# Patient Record
Sex: FEMALE | Race: BLACK OR AFRICAN AMERICAN | NOT HISPANIC OR LATINO | Employment: FULL TIME | URBAN - METROPOLITAN AREA
[De-identification: names, ages, dates, MRNs, and addresses within clinical notes are randomized per-mention and may not be internally consistent; named-entity substitution may affect disease eponyms.]

---
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
## 2020-04-03 ENCOUNTER — TELEPHONE (OUTPATIENT)
Dept: FAMILY MEDICINE | Facility: CLINIC | Age: 20
End: 2020-04-03

## 2020-04-03 NOTE — TELEPHONE ENCOUNTER
Spoke with patient requesting medication for yeast infection SX vaginal D/C and itching , advised patient to check with pharmacy over the counter medication like Monistat or AZO , please advise.

## 2020-04-03 NOTE — TELEPHONE ENCOUNTER
----- Message from Yennifer Salter sent at 4/3/2020  4:45 PM CDT -----  Contact: Self  Type: Patient Call Back    Who called:Jesica    What is the request in detail:Patient called to request medication for a yeast infection. Please call to advise    Can the clinic reply by [facility]?    Would the patient rather a call back or a response via [facility]? Call    Best call back number:[phone]

## 2020-04-04 RX ORDER — FLUCONAZOLE 150 MG/1
150 TABLET ORAL ONCE
Qty: 1 TABLET | Refills: 0 | Status: SHIPPED | OUTPATIENT
Start: 2020-04-04 | End: 2020-04-04

## 2020-05-01 ENCOUNTER — HOSPITAL ENCOUNTER (EMERGENCY)
Facility: HOSPITAL | Age: 20
Discharge: HOME | End: 2020-05-01
Attending: EMERGENCY MEDICINE
Payer: COMMERCIAL

## 2020-05-01 VITALS
OXYGEN SATURATION: 95 % | RESPIRATION RATE: 15 BRPM | TEMPERATURE: 99 F | BODY MASS INDEX: 19.58 KG/M2 | WEIGHT: 125 LBS | HEART RATE: 78 BPM | SYSTOLIC BLOOD PRESSURE: 111 MMHG | DIASTOLIC BLOOD PRESSURE: 70 MMHG

## 2020-05-01 DIAGNOSIS — J06.9 URI, ACUTE: Primary | ICD-10-CM

## 2020-05-01 DIAGNOSIS — R05.9 COUGH: ICD-10-CM

## 2020-05-01 LAB — SARS-COV-2 RDRP RESP QL NAA+PROBE: NEGATIVE

## 2020-05-01 PROCEDURE — 99283 EMERGENCY DEPT VISIT LOW MDM: CPT | Mod: 25

## 2020-05-01 PROCEDURE — U0002 COVID-19 LAB TEST NON-CDC: HCPCS

## 2020-05-01 NOTE — ED NOTES
Bed: [facility] 05  Expected date:   Expected time:   Means of arrival:   Comments:

## 2020-05-01 NOTE — ED PROVIDER NOTES
Encounter Date: 5/1/2020       History     Chief Complaint   Patient presents with    COVID-19 Concerns     Boyfriend tested positive 3 days ago. Reports cough and SOB. Denies any other symptoms.      19 year old female with complaint of cough and congestion X 2 days.  Also reports mild SOB.  Reports boyfriend tested positive for COVID-19.  No chest pain. NO vomiting or diarrhea.         Review of patient's allergies indicates:  No Known Allergies  Past Medical History:   Diagnosis Date    Allergy     Asthma      History reviewed. No pertinent surgical history.  Family History   Problem Relation Age of Onset    Asthma Mother     Hypertension Mother     ADD / ADHD Father     Asthma Maternal Grandmother     Cancer Maternal Grandmother         uterus     Heart disease Maternal Grandmother     Cancer Maternal Grandfather         prostates     Cancer Paternal Grandmother         uterus      Social History     Tobacco Use    Smoking status: Never Smoker    Smokeless tobacco: Never Used   Substance Use Topics    Alcohol use: No    Drug use: No     Review of Systems   Constitutional: Negative for fever.   HENT: Positive for congestion. Negative for sore throat.    Respiratory: Positive for cough. Negative for shortness of breath.    Cardiovascular: Negative for chest pain.   Gastrointestinal: Negative for nausea.   Genitourinary: Negative for dysuria.   Musculoskeletal: Negative for back pain.   Skin: Negative for rash.   Neurological: Negative for weakness.   Hematological: Does not bruise/bleed easily.       Physical Exam     Initial Vitals [05/01/20 1310]   BP Pulse Resp Temp SpO2   111/70 78 15 99 °F (37.2 °C) 95 %      MAP       --         Physical Exam    Nursing note and vitals reviewed.  Constitutional: She appears well-developed and well-nourished.   HENT:   Head: Normocephalic and atraumatic.   + nasal congestion    Eyes: Conjunctivae and EOM are normal. Pupils are equal, round, and reactive to  light.   Neck: Normal range of motion. Neck supple.   Cardiovascular: Normal rate, regular rhythm, normal heart sounds and intact distal pulses.   Pulmonary/Chest: Breath sounds normal.   Abdominal: Soft. There is no tenderness. There is no rebound and no guarding.   Musculoskeletal: Normal range of motion.   Neurological: She is alert and oriented to person, place, and time. She has normal strength and normal reflexes.   Skin: Skin is warm and dry.   Psychiatric: She has a normal mood and affect. Her behavior is normal. Thought content normal.         ED Course   Procedures  Labs Reviewed   SARS-COV-2 RNA AMPLIFICATION, QUAL    Narrative:     What symptom criteria does the patient meet?->Cough  What symptom criteria does the patient meet?->Shortness of  breath or difficulty breathing          Imaging Results          X-Ray Chest 1 View (Final result)  Result time 05/01/20 13:51:51    Final result by Roscoe Shirley MD (05/01/20 13:51:51)                 Impression:      No acute process seen.      Electronically signed by: Rosoce Shirley MD  Date:    05/01/2020  Time:    13:51             Narrative:    EXAMINATION:  XR CHEST 1 VIEW    CLINICAL HISTORY:  Cough    FINDINGS:  Single view of the chest.  08/11/2013    Cardiac silhouette is normal.  The lungs demonstrate no evidence of active disease.  No evidence of pleural effusion or pneumothorax.  Bones appear intact.                                     Labs Reviewed   SARS-COV-2 RNA AMPLIFICATION, QUAL    Narrative:     What symptom criteria does the patient meet?->Cough  What symptom criteria does the patient meet?->Shortness of  breath or difficulty breathing                 Imaging Results          X-Ray Chest 1 View (Final result)  Result time 05/01/20 13:51:51    Final result by Roscoe Shirley MD (05/01/20 13:51:51)                 Impression:      No acute process seen.      Electronically signed by: Roscoe Shirley MD  Date:    05/01/2020  Time:    13:51              Narrative:    EXAMINATION:  XR CHEST 1 VIEW    CLINICAL HISTORY:  Cough    FINDINGS:  Single view of the chest.  08/11/2013    Cardiac silhouette is normal.  The lungs demonstrate no evidence of active disease.  No evidence of pleural effusion or pneumothorax.  Bones appear intact.                                                    Clinical Impression:       ICD-10-CM ICD-9-CM   1. URI, acute J06.9 465.9   2. Cough R05 786.2             ED Disposition Condition    Discharge Stable        ED Prescriptions     None        Follow-up Information     Follow up With Specialties Details Why Contact Info    Shannon Robin MD Family Medicine Schedule an appointment as soon as possible for a visit  As needed [id number] [facility]  [place] [id number]  [phone]                                       Jhon Sepulveda NP  05/01/20 1408       Jhon Sepulveda NP  05/01/20 1401

## 2020-07-29 ENCOUNTER — OFFICE VISIT (OUTPATIENT)
Dept: FAMILY MEDICINE | Facility: CLINIC | Age: 20
End: 2020-07-29
Payer: COMMERCIAL

## 2020-07-29 VITALS
TEMPERATURE: 98 F | HEART RATE: 84 BPM | OXYGEN SATURATION: 97 % | HEIGHT: 67 IN | BODY MASS INDEX: 19.62 KG/M2 | SYSTOLIC BLOOD PRESSURE: 100 MMHG | WEIGHT: 125 LBS | DIASTOLIC BLOOD PRESSURE: 64 MMHG

## 2020-07-29 DIAGNOSIS — N76.0 BV (BACTERIAL VAGINOSIS): Primary | ICD-10-CM

## 2020-07-29 DIAGNOSIS — B96.89 BV (BACTERIAL VAGINOSIS): Primary | ICD-10-CM

## 2020-07-29 PROCEDURE — 99999 PR PBB SHADOW E&M-EST. PATIENT-LVL III: CPT | Mod: PBBFAC,,, | Performed by: FAMILY MEDICINE

## 2020-07-29 PROCEDURE — 3008F PR BODY MASS INDEX (BMI) DOCUMENTED: ICD-10-PCS | Mod: CPTII,S$GLB,, | Performed by: FAMILY MEDICINE

## 2020-07-29 PROCEDURE — 99214 OFFICE O/P EST MOD 30 MIN: CPT | Mod: S$GLB,,, | Performed by: FAMILY MEDICINE

## 2020-07-29 PROCEDURE — 99999 PR PBB SHADOW E&M-EST. PATIENT-LVL III: ICD-10-PCS | Mod: PBBFAC,,, | Performed by: FAMILY MEDICINE

## 2020-07-29 PROCEDURE — 99214 PR OFFICE/OUTPT VISIT, EST, LEVL IV, 30-39 MIN: ICD-10-PCS | Mod: S$GLB,,, | Performed by: FAMILY MEDICINE

## 2020-07-29 PROCEDURE — 3008F BODY MASS INDEX DOCD: CPT | Mod: CPTII,S$GLB,, | Performed by: FAMILY MEDICINE

## 2020-07-29 RX ORDER — METRONIDAZOLE 7.5 MG/G
1 GEL VAGINAL 2 TIMES DAILY
Qty: 70 G | Refills: 0 | Status: SHIPPED | OUTPATIENT
Start: 2020-07-29 | End: 2020-08-05

## 2020-07-29 NOTE — PROGRESS NOTES
Subjective:       Patient ID: Jesica Noe is a 19 y.o. female.    Chief Complaint: Annual Exam    19 year old female presents for an annual exam. She states since she has been on the depot shot she gets a vaginal discharge. She states it comes and goes. She states it has an odor. She states she changed her soap as it was making her body itch.       She states her school wants a note that she is no longer on her birth control. They feel she was on a birth control that caused her depression. She failed a class and they want a note stating that she is no longer depressed and on birth control. She states she was sad as she was home sick.     Past Medical History:  No date: Allergy  No date: Asthma   History reviewed. No pertinent surgical history.  Review of patient's family history indicates:  Problem: Asthma      Relation: Mother          Age of Onset: (Not Specified)  Problem: Hypertension      Relation: Mother          Age of Onset: (Not Specified)  Problem: ADD / ADHD      Relation: Father          Age of Onset: (Not Specified)  Problem: Asthma      Relation: Maternal Grandmother          Age of Onset: (Not Specified)  Problem: Cancer      Relation: Maternal Grandmother          Age of Onset: (Not Specified)          Comment: uterus   Problem: Heart disease      Relation: Maternal Grandmother          Age of Onset: (Not Specified)  Problem: Cancer      Relation: Maternal Grandfather          Age of Onset: (Not Specified)          Comment: prostates   Problem: Cancer      Relation: Paternal Grandmother          Age of Onset: (Not Specified)          Comment: uterus     Social History    Socioeconomic History      Marital status: Single      Spouse name: Not on file      Number of children: Not on file      Years of education: Not on file      Highest education level: Not on file    Occupational History      Not on file    Social Needs      Financial resource strain: Not on file      Food insecurity        Worry:  "Not on file        Inability: Not on file      Transportation needs        Medical: Not on file        Non-medical: Not on file    Tobacco Use      Smoking status: Never Smoker      Smokeless tobacco: Never Used    Substance and Sexual Activity      Alcohol use: No      Drug use: No      Sexual activity: Yes        Partners: Male        Birth control/protection: Injection    Lifestyle      Physical activity        Days per week: Not on file        Minutes per session: Not on file      Stress: Not on file    Relationships      Social connections        Talks on phone: Not on file        Gets together: Not on file        Attends [employer] service: Not on file        Active member of club or organization: Not on file        Attends meetings of clubs or organizations: Not on file        Relationship status: Not on file    Other Topics      Concerns:        Not on file    Social History Narrative      Not on file        Review of Systems      Objective:       Vitals:    07/29/20 1318   BP: 100/64   Pulse: 84   Temp: 98 °F (36.7 °C)   TempSrc: Oral   SpO2: 97%   Weight: 56.7 kg (125 lb)   Height: 5' 6.5" (1.689 m)       Physical Exam  Constitutional:       Appearance: Normal appearance.   Neurological:      Mental Status: She is alert.   Psychiatric:         Mood and Affect: Mood normal.         Behavior: Behavior normal.         Thought Content: Thought content normal.         Judgment: Judgment normal.         Assessment:       1. BV (bacterial vaginosis)        Plan:       Jesica was seen today for annual exam.    Diagnoses and all orders for this visit:    BV (bacterial vaginosis)  -     metroNIDAZOLE (METROGEL) 0.75 % vaginal gel; Place 1 applicator vaginally 2 (two) times daily. for 7 days           "

## 2020-08-13 ENCOUNTER — TELEPHONE (OUTPATIENT)
Dept: FAMILY MEDICINE | Facility: CLINIC | Age: 20
End: 2020-08-13

## 2020-08-13 NOTE — TELEPHONE ENCOUNTER
Return call to Pt, and she states that she lost the letter that was given to her by the Provider. She was needing it to be emailed to her. Informed her that we don't email. That she can either pick it up at the office or it can be mailed to her. She asked that it be faxed. I asked for the fax number, and she stated that she will call back with the number to her school.

## 2020-08-13 NOTE — TELEPHONE ENCOUNTER
----- Message from SalvadorHeide Christy sent at 8/13/2020  4:01 PM CDT -----  Regarding: Letter  Contact: Patient  Type: Patient Call Back    Who called:Patient    What is the request in detail: She states she needs letter emailed to her. Please advise.    Can the clinic reply by [facility]? No    Would the patient rather a call back or a response via [facility]? Call    Best call back number: [phone]    Additional Information: n/a

## 2020-08-14 ENCOUNTER — TELEPHONE (OUTPATIENT)
Dept: FAMILY MEDICINE | Facility: CLINIC | Age: 20
End: 2020-08-14

## 2020-08-14 DIAGNOSIS — Z11.59 NEED FOR HEPATITIS C SCREENING TEST: ICD-10-CM

## 2020-08-14 NOTE — TELEPHONE ENCOUNTER
----- Message from Inocencia Christy sent at 8/14/2020  1:23 PM CDT -----  Type: Patient Call Back    Who called: Self     What is the request in detail: patient says the fax number to send the information to her school is Fax: [phone]. Patient would like to be notified when it is taken care of. Please call     Can the clinic reply by [facility]? No     Would the patient rather a call back or a response via [facility]?  Call     Best call back number: [phone]

## 2020-08-17 ENCOUNTER — TELEPHONE (OUTPATIENT)
Dept: FAMILY MEDICINE | Facility: CLINIC | Age: 20
End: 2020-08-17

## 2020-09-03 ENCOUNTER — HOSPITAL ENCOUNTER (EMERGENCY)
Facility: HOSPITAL | Age: 20
Discharge: HOME | End: 2020-09-03
Attending: EMERGENCY MEDICINE
Payer: COMMERCIAL

## 2020-09-03 VITALS
HEART RATE: 87 BPM | TEMPERATURE: 98 F | WEIGHT: 123.25 LBS | HEIGHT: 67 IN | SYSTOLIC BLOOD PRESSURE: 134 MMHG | OXYGEN SATURATION: 96 % | RESPIRATION RATE: 16 BRPM | BODY MASS INDEX: 19.35 KG/M2 | DIASTOLIC BLOOD PRESSURE: 80 MMHG

## 2020-09-03 DIAGNOSIS — L02.91 ABSCESS: Primary | ICD-10-CM

## 2020-09-03 PROCEDURE — 99283 EMERGENCY DEPT VISIT LOW MDM: CPT

## 2020-09-03 RX ORDER — CLINDAMYCIN HYDROCHLORIDE 300 MG/1
300 CAPSULE ORAL 3 TIMES DAILY
Qty: 21 CAPSULE | Refills: 0 | Status: SHIPPED | OUTPATIENT
Start: 2020-09-03 | End: 2020-09-10

## 2020-09-04 NOTE — ED PROVIDER NOTES
HISTORY     Chief Complaint   Patient presents with    Insect Bite     L knee; pain, swelling, redness     Review of patient's allergies indicates:  No Known Allergies     HPI   The history is provided by the patient. No  was used.   Abscess   This is a new problem. The current episode started two days ago. The problem occurs rarely. The problem has been unchanged. Affected Location: left anterior knee. The abscess is characterized by redness and painfulness. Pertinent negatives include no anorexia, no decrease in physical activity, not sleeping less, not drinking less, no fever, no diarrhea, no vomiting, no congestion, no rhinorrhea, no sore throat, no decreased responsiveness and no cough.    pt reports she went to urgent care yesterday and was placed on bactrim. Pt requesting different antibiotic     PCP: Shannon Robin MD     Past Medical History:  Past Medical History:   Diagnosis Date    Allergy     Asthma         Past Surgical History:  History reviewed. No pertinent surgical history.     Family History:  Family History   Problem Relation Age of Onset    Asthma Mother     Hypertension Mother     ADD / ADHD Father     Asthma Maternal Grandmother     Cancer Maternal Grandmother         uterus     Heart disease Maternal Grandmother     Cancer Maternal Grandfather         prostates     Cancer Paternal Grandmother         uterus         Social History:  Social History     Tobacco Use    Smoking status: Never Smoker    Smokeless tobacco: Never Used   Substance and Sexual Activity    Alcohol use: No    Drug use: No    Sexual activity: Yes     Partners: Male     Birth control/protection: Injection         ROS   Review of Systems   Constitutional: Negative for decreased responsiveness and fever.   HENT: Negative for congestion, rhinorrhea and sore throat.    Respiratory: Negative for cough and shortness of breath.    Cardiovascular: Negative for chest pain.  "  Gastrointestinal: Negative for anorexia, diarrhea, nausea and vomiting.   Genitourinary: Negative for dysuria.   Musculoskeletal: Negative for back pain.   Skin: Negative for rash.        +  Abscess     Neurological: Negative for weakness.   Hematological: Does not bruise/bleed easily.       PHYSICAL EXAM     Initial Vitals [09/03/20 2246]   BP Pulse Resp Temp SpO2   134/80 87 16 98 °F (36.7 °C) 96 %      MAP       --           Physical Exam    Nursing note and vitals reviewed.  Constitutional: She appears well-developed and well-nourished. She is not diaphoretic. No distress.   HENT:   Head: Normocephalic and atraumatic.   Eyes: Conjunctivae are normal. Right eye exhibits no discharge. Left eye exhibits no discharge.   Neck: Normal range of motion. Neck supple.   Cardiovascular: Normal rate.   Pulmonary/Chest: Breath sounds normal. No respiratory distress. She has no wheezes. She has no rhonchi. She has no rales.   Musculoskeletal: Normal range of motion.   Neurological: She is alert and oriented to person, place, and time. She has normal strength.   Skin: Skin is warm and dry.   2cm abscess above left anterior knee. Mild induration and fluctuance    Psychiatric: She has a normal mood and affect. Her behavior is normal. Thought content normal.          ED COURSE   Procedures  ED ONGOING VITALS:  Vitals:    09/03/20 2246   BP: 134/80   Pulse: 87   Resp: 16   Temp: 98 °F (36.7 °C)   TempSrc: Oral   SpO2: 96%   Weight: 55.9 kg (123 lb 3.8 oz)   Height: 5' 7" (1.702 m)         ABNORMAL LAB VALUES:  Labs Reviewed - No data to display      ALL LAB VALUES:  none      RADIOLOGY STUDIES:  Imaging Results    None                   The above vital signs and test results have been reviewed by the emergency provider.     ED Medications:  Current Discharge Medication List        Discharge Medications:  New Prescriptions    CLINDAMYCIN (CLEOCIN) 300 MG CAPSULE    Take 1 capsule (300 mg total) by mouth 3 (three) times daily. for " 7 days      Follow-up Information     Shannon Robin MD In 3 days.    Specialty: Family Medicine  Why: For wound re-check  Contact information:  [address] [place] [address]  [phone]                10:55 PM  Pt refusing I and d in the ER. Pt reports she only wants antibiotic treatment. Risks/benefits discussed.    I discussed with patient and/or family/caretaker that evaluation in the ED does not suggest any emergent or life threatening medical conditions requiring immediate intervention beyond what was provided in the ED, and I believe patient is safe for discharge. Regardless, an unremarkable evaluation in the ED does not preclude the development or presence of a serious or life threatening condition. As such, patient was instructed to return immediately for any worsening or change in current symptoms.    Pre-hypertension/Hypertension: The pt has been informed that they may have pre-hypertension or hypertension based on a blood pressure reading in the ED. I recommend that the pt call the PCP listed on their discharge instructions or a physician of their choice this week to arrange f/u for further evaluation of possible pre-hypertension or hypertension.       MEDICAL DECISION MAKING                 CLINICAL IMPRESSION       ICD-10-CM ICD-9-CM   1. Abscess  L02.91 682.9       Disposition:   Disposition: Discharged  Condition: Stable         Drew Perea NP  09/03/20 9041

## 2020-12-21 ENCOUNTER — OFFICE VISIT (OUTPATIENT)
Dept: INTERNAL MEDICINE | Facility: CLINIC | Age: 20
End: 2020-12-21
Payer: COMMERCIAL

## 2020-12-21 VITALS
DIASTOLIC BLOOD PRESSURE: 80 MMHG | OXYGEN SATURATION: 98 % | HEIGHT: 67 IN | BODY MASS INDEX: 20.48 KG/M2 | TEMPERATURE: 98 F | SYSTOLIC BLOOD PRESSURE: 122 MMHG | HEART RATE: 72 BPM | WEIGHT: 130.5 LBS

## 2020-12-21 DIAGNOSIS — Z00.00 PHYSICAL EXAM: Primary | ICD-10-CM

## 2020-12-21 PROCEDURE — 3008F PR BODY MASS INDEX (BMI) DOCUMENTED: ICD-10-PCS | Mod: CPTII,S$GLB,, | Performed by: PHYSICIAN ASSISTANT

## 2020-12-21 PROCEDURE — 3008F BODY MASS INDEX DOCD: CPT | Mod: CPTII,S$GLB,, | Performed by: PHYSICIAN ASSISTANT

## 2020-12-21 PROCEDURE — 99395 PREV VISIT EST AGE 18-39: CPT | Mod: S$GLB,,, | Performed by: PHYSICIAN ASSISTANT

## 2020-12-21 PROCEDURE — 99999 PR PBB SHADOW E&M-EST. PATIENT-LVL III: ICD-10-PCS | Mod: PBBFAC,,, | Performed by: PHYSICIAN ASSISTANT

## 2020-12-21 PROCEDURE — 1126F PR PAIN SEVERITY QUANTIFIED, NO PAIN PRESENT: ICD-10-PCS | Mod: S$GLB,,, | Performed by: PHYSICIAN ASSISTANT

## 2020-12-21 PROCEDURE — 99999 PR PBB SHADOW E&M-EST. PATIENT-LVL III: CPT | Mod: PBBFAC,,, | Performed by: PHYSICIAN ASSISTANT

## 2020-12-21 PROCEDURE — 1126F AMNT PAIN NOTED NONE PRSNT: CPT | Mod: S$GLB,,, | Performed by: PHYSICIAN ASSISTANT

## 2020-12-21 PROCEDURE — 99395 PR PREVENTIVE VISIT,EST,18-39: ICD-10-PCS | Mod: S$GLB,,, | Performed by: PHYSICIAN ASSISTANT

## 2020-12-21 NOTE — PROGRESS NOTES
Subjective:      Patient ID: Jesica Noe is a 20 y.o. female.    Chief Complaint: Annual Exam    HPI   Here today for a doctors certificate formed to become a . Needs clearance to do a fitness test (sit-ups, push-ups, 1.5 mile run).     Patient Active Problem List   Diagnosis    Asthma-stable. Mild. Rarely needs inhaler.     Chronic midline thoracic back pain -conservative management.    No chest pain, shortness of breath, or palpitations. No weakness/dizziness/lightheadedness.   Exertion is good.     Current Outpatient Medications:     albuterol (PROAIR HFA) 90 mcg/actuation inhaler, Inhale 2 puffs into the lungs every 4 (four) hours as needed., Disp: 1 Inhaler, Rfl: 12    fluticasone propionate (FLONASE) 50 mcg/actuation nasal spray, 1 spray (50 mcg total) by Each Nare route once daily., Disp: 16 g, Rfl: 5    ibuprofen (ADVIL,MOTRIN) 400 MG tablet, TAKE 1 TABLET(400 MG) BY MOUTH EVERY 6 HOURS AS NEEDED, Disp: 385 tablet, Rfl: 2    levocetirizine (XYZAL) 5 MG tablet, Take 1 tablet (5 mg total) by mouth every evening., Disp: 30 tablet, Rfl: 11    Review of Systems   Constitutional: Negative for activity change, appetite change, chills, diaphoresis, fatigue, fever and unexpected weight change.   HENT: Negative.  Negative for congestion, hearing loss, postnasal drip, rhinorrhea, sore throat, trouble swallowing and voice change.    Eyes: Negative.  Negative for visual disturbance.   Respiratory: Negative.  Negative for cough, choking, chest tightness and shortness of breath.    Cardiovascular: Negative for chest pain, palpitations and leg swelling.   Gastrointestinal: Negative for abdominal distention, abdominal pain, blood in stool, constipation, diarrhea, nausea and vomiting.   Endocrine: Negative for cold intolerance, heat intolerance, polydipsia and polyuria.   Genitourinary: Negative.  Negative for difficulty urinating and frequency.   Musculoskeletal: Negative for arthralgias, back  "pain, gait problem, joint swelling and myalgias.   Skin: Negative for color change, pallor, rash and wound.   Neurological: Negative for dizziness, tremors, weakness, light-headedness, numbness and headaches.   Hematological: Negative for adenopathy.   Psychiatric/Behavioral: Negative for behavioral problems, confusion, self-injury, sleep disturbance and suicidal ideas. The patient is not nervous/anxious.      Objective:   /80 (BP Location: Left arm, Patient Position: Sitting, BP Method: Medium (Manual))   Pulse 72   Temp 98.2 °F (36.8 °C) (Tympanic)   Ht 5' 7" (1.702 m)   Wt 59.2 kg (130 lb 8.2 oz)   SpO2 98%   BMI 20.44 kg/m²     Physical Exam  Vitals signs reviewed.   Constitutional:       Appearance: She is well-developed.   HENT:      Head: Normocephalic and atraumatic.      Right Ear: External ear normal.      Left Ear: External ear normal.      Nose: Nose normal.   Eyes:      Conjunctiva/sclera: Conjunctivae normal.      Pupils: Pupils are equal, round, and reactive to light.   Neck:      Musculoskeletal: Normal range of motion and neck supple.   Cardiovascular:      Rate and Rhythm: Normal rate and regular rhythm.      Heart sounds: Normal heart sounds. No murmur. No friction rub. No gallop.    Pulmonary:      Effort: Pulmonary effort is normal. No respiratory distress.      Breath sounds: Normal breath sounds. No wheezing or rales.   Chest:      Chest wall: No tenderness.   Abdominal:      General: There is no distension.      Palpations: Abdomen is soft.      Tenderness: There is no abdominal tenderness.   Musculoskeletal: Normal range of motion.   Lymphadenopathy:      Cervical: No cervical adenopathy.   Skin:     General: Skin is warm and dry.   Neurological:      Mental Status: She is alert and oriented to person, place, and time.   Psychiatric:         Behavior: Behavior normal.         Thought Content: Thought content normal.         Judgment: Judgment normal.         Assessment:     1. " Physical exam      Plan:   Physical exam    -stretch before exercise  Advise to maintain lifestyle changes with low carbohydrate, low sugar diet and exercise 30 minutes daily    Follow up in about 1 year (around 12/21/2021), or if symptoms worsen or fail to improve.

## 2021-04-12 ENCOUNTER — OFFICE VISIT (OUTPATIENT)
Dept: INTERNAL MEDICINE | Facility: CLINIC | Age: 21
End: 2021-04-12
Payer: COMMERCIAL

## 2021-04-12 VITALS
OXYGEN SATURATION: 98 % | HEIGHT: 66 IN | DIASTOLIC BLOOD PRESSURE: 62 MMHG | BODY MASS INDEX: 21.49 KG/M2 | RESPIRATION RATE: 18 BRPM | WEIGHT: 133.69 LBS | SYSTOLIC BLOOD PRESSURE: 104 MMHG | HEART RATE: 68 BPM | TEMPERATURE: 99 F

## 2021-04-12 DIAGNOSIS — R82.90 MALODOROUS URINE: Primary | ICD-10-CM

## 2021-04-12 LAB
BILIRUB SERPL-MCNC: NORMAL MG/DL
BILIRUB UR QL STRIP: NEGATIVE
BLOOD URINE, POC: NORMAL
CLARITY UR REFRACT.AUTO: CLEAR
COLOR UR AUTO: YELLOW
COLOR, POC UA: NORMAL
GLUCOSE UR QL STRIP: NEGATIVE
GLUCOSE UR QL STRIP: NORMAL
HGB UR QL STRIP: NEGATIVE
KETONES UR QL STRIP: NEGATIVE
KETONES UR QL STRIP: NORMAL
LEUKOCYTE ESTERASE UR QL STRIP: NEGATIVE
LEUKOCYTE ESTERASE URINE, POC: NORMAL
NITRITE UR QL STRIP: NEGATIVE
NITRITE, POC UA: NORMAL
PH UR STRIP: 6 [PH] (ref 5–8)
PH, POC UA: 6
PROT UR QL STRIP: NEGATIVE
PROTEIN, POC: NORMAL
SP GR UR STRIP: 1.02 (ref 1–1.03)
SPECIFIC GRAVITY, POC UA: 1.01
URN SPEC COLLECT METH UR: NORMAL
UROBILINOGEN, POC UA: NORMAL

## 2021-04-12 PROCEDURE — 81003 URINALYSIS AUTO W/O SCOPE: CPT | Performed by: FAMILY MEDICINE

## 2021-04-12 PROCEDURE — 81001 POCT URINALYSIS, DIPSTICK OR TABLET REAGENT, AUTOMATED, WITH MICROSCOP: ICD-10-PCS | Mod: S$GLB,,, | Performed by: FAMILY MEDICINE

## 2021-04-12 PROCEDURE — 81001 URINALYSIS AUTO W/SCOPE: CPT | Mod: S$GLB,,, | Performed by: FAMILY MEDICINE

## 2021-04-12 PROCEDURE — 1126F AMNT PAIN NOTED NONE PRSNT: CPT | Mod: S$GLB,,, | Performed by: FAMILY MEDICINE

## 2021-04-12 PROCEDURE — 99212 OFFICE O/P EST SF 10 MIN: CPT | Mod: 25,S$GLB,, | Performed by: FAMILY MEDICINE

## 2021-04-12 PROCEDURE — 3008F BODY MASS INDEX DOCD: CPT | Mod: CPTII,S$GLB,, | Performed by: FAMILY MEDICINE

## 2021-04-12 PROCEDURE — 3008F PR BODY MASS INDEX (BMI) DOCUMENTED: ICD-10-PCS | Mod: CPTII,S$GLB,, | Performed by: FAMILY MEDICINE

## 2021-04-12 PROCEDURE — 99999 PR PBB SHADOW E&M-EST. PATIENT-LVL III: ICD-10-PCS | Mod: PBBFAC,,, | Performed by: FAMILY MEDICINE

## 2021-04-12 PROCEDURE — 1126F PR PAIN SEVERITY QUANTIFIED, NO PAIN PRESENT: ICD-10-PCS | Mod: S$GLB,,, | Performed by: FAMILY MEDICINE

## 2021-04-12 PROCEDURE — 99999 PR PBB SHADOW E&M-EST. PATIENT-LVL III: CPT | Mod: PBBFAC,,, | Performed by: FAMILY MEDICINE

## 2021-04-12 PROCEDURE — 99212 PR OFFICE/OUTPT VISIT, EST, LEVL II, 10-19 MIN: ICD-10-PCS | Mod: 25,S$GLB,, | Performed by: FAMILY MEDICINE

## 2021-04-13 ENCOUNTER — TELEPHONE (OUTPATIENT)
Dept: INTERNAL MEDICINE | Facility: CLINIC | Age: 21
End: 2021-04-13

## 2021-04-16 ENCOUNTER — PATIENT MESSAGE (OUTPATIENT)
Dept: RESEARCH | Facility: HOSPITAL | Age: 21
End: 2021-04-16

## 2021-08-05 ENCOUNTER — OFFICE VISIT (OUTPATIENT)
Dept: INTERNAL MEDICINE | Facility: CLINIC | Age: 21
End: 2021-08-05
Payer: COMMERCIAL

## 2021-08-05 VITALS
SYSTOLIC BLOOD PRESSURE: 114 MMHG | BODY MASS INDEX: 22 KG/M2 | OXYGEN SATURATION: 98 % | RESPIRATION RATE: 14 BRPM | WEIGHT: 136.88 LBS | HEART RATE: 51 BPM | DIASTOLIC BLOOD PRESSURE: 68 MMHG | HEIGHT: 66 IN

## 2021-08-05 DIAGNOSIS — N89.8 VAGINAL DISCHARGE: Primary | ICD-10-CM

## 2021-08-05 PROCEDURE — 3078F DIAST BP <80 MM HG: CPT | Mod: CPTII,S$GLB,, | Performed by: PHYSICIAN ASSISTANT

## 2021-08-05 PROCEDURE — 1126F AMNT PAIN NOTED NONE PRSNT: CPT | Mod: CPTII,S$GLB,, | Performed by: PHYSICIAN ASSISTANT

## 2021-08-05 PROCEDURE — 1160F PR REVIEW ALL MEDS BY PRESCRIBER/CLIN PHARMACIST DOCUMENTED: ICD-10-PCS | Mod: CPTII,S$GLB,, | Performed by: PHYSICIAN ASSISTANT

## 2021-08-05 PROCEDURE — 1159F MED LIST DOCD IN RCRD: CPT | Mod: CPTII,S$GLB,, | Performed by: PHYSICIAN ASSISTANT

## 2021-08-05 PROCEDURE — 1159F PR MEDICATION LIST DOCUMENTED IN MEDICAL RECORD: ICD-10-PCS | Mod: CPTII,S$GLB,, | Performed by: PHYSICIAN ASSISTANT

## 2021-08-05 PROCEDURE — 3074F PR MOST RECENT SYSTOLIC BLOOD PRESSURE < 130 MM HG: ICD-10-PCS | Mod: CPTII,S$GLB,, | Performed by: PHYSICIAN ASSISTANT

## 2021-08-05 PROCEDURE — 3008F BODY MASS INDEX DOCD: CPT | Mod: CPTII,S$GLB,, | Performed by: PHYSICIAN ASSISTANT

## 2021-08-05 PROCEDURE — 87491 CHLMYD TRACH DNA AMP PROBE: CPT | Performed by: PHYSICIAN ASSISTANT

## 2021-08-05 PROCEDURE — 3078F PR MOST RECENT DIASTOLIC BLOOD PRESSURE < 80 MM HG: ICD-10-PCS | Mod: CPTII,S$GLB,, | Performed by: PHYSICIAN ASSISTANT

## 2021-08-05 PROCEDURE — 87481 CANDIDA DNA AMP PROBE: CPT | Mod: 59 | Performed by: PHYSICIAN ASSISTANT

## 2021-08-05 PROCEDURE — 99213 OFFICE O/P EST LOW 20 MIN: CPT | Mod: S$GLB,,, | Performed by: PHYSICIAN ASSISTANT

## 2021-08-05 PROCEDURE — 99999 PR PBB SHADOW E&M-EST. PATIENT-LVL IV: CPT | Mod: PBBFAC,,, | Performed by: PHYSICIAN ASSISTANT

## 2021-08-05 PROCEDURE — 3008F PR BODY MASS INDEX (BMI) DOCUMENTED: ICD-10-PCS | Mod: CPTII,S$GLB,, | Performed by: PHYSICIAN ASSISTANT

## 2021-08-05 PROCEDURE — 3074F SYST BP LT 130 MM HG: CPT | Mod: CPTII,S$GLB,, | Performed by: PHYSICIAN ASSISTANT

## 2021-08-05 PROCEDURE — 1126F PR PAIN SEVERITY QUANTIFIED, NO PAIN PRESENT: ICD-10-PCS | Mod: CPTII,S$GLB,, | Performed by: PHYSICIAN ASSISTANT

## 2021-08-05 PROCEDURE — 99213 PR OFFICE/OUTPT VISIT, EST, LEVL III, 20-29 MIN: ICD-10-PCS | Mod: S$GLB,,, | Performed by: PHYSICIAN ASSISTANT

## 2021-08-05 PROCEDURE — 87591 N.GONORRHOEAE DNA AMP PROB: CPT | Mod: 59 | Performed by: PHYSICIAN ASSISTANT

## 2021-08-05 PROCEDURE — 1160F RVW MEDS BY RX/DR IN RCRD: CPT | Mod: CPTII,S$GLB,, | Performed by: PHYSICIAN ASSISTANT

## 2021-08-05 PROCEDURE — 99999 PR PBB SHADOW E&M-EST. PATIENT-LVL IV: ICD-10-PCS | Mod: PBBFAC,,, | Performed by: PHYSICIAN ASSISTANT

## 2021-08-07 LAB
C TRACH DNA SPEC QL NAA+PROBE: NOT DETECTED
N GONORRHOEA DNA SPEC QL NAA+PROBE: NOT DETECTED

## 2021-08-09 LAB
BACTERIAL VAGINOSIS DNA: POSITIVE
CANDIDA GLABRATA DNA: NEGATIVE
CANDIDA KRUSEI DNA: NEGATIVE
CANDIDA RRNA VAG QL PROBE: NEGATIVE
T VAGINALIS RRNA GENITAL QL PROBE: NEGATIVE

## 2021-08-10 ENCOUNTER — TELEPHONE (OUTPATIENT)
Dept: INTERNAL MEDICINE | Facility: CLINIC | Age: 21
End: 2021-08-10

## 2021-08-10 DIAGNOSIS — N76.0 BACTERIAL VAGINOSIS: Primary | ICD-10-CM

## 2021-08-10 DIAGNOSIS — B96.89 BACTERIAL VAGINOSIS: ICD-10-CM

## 2021-08-10 DIAGNOSIS — N76.0 BACTERIAL VAGINOSIS: ICD-10-CM

## 2021-08-10 DIAGNOSIS — B96.89 BACTERIAL VAGINOSIS: Primary | ICD-10-CM

## 2021-08-10 RX ORDER — METRONIDAZOLE 7.5 MG/G
1 GEL VAGINAL 2 TIMES DAILY
Qty: 70 G | Refills: 0 | Status: SHIPPED | OUTPATIENT
Start: 2021-08-10 | End: 2021-08-10 | Stop reason: SDUPTHER

## 2021-08-10 RX ORDER — METRONIDAZOLE 7.5 MG/G
1 GEL VAGINAL 2 TIMES DAILY
Qty: 70 G | Refills: 0 | Status: SHIPPED | OUTPATIENT
Start: 2021-08-10 | End: 2021-08-17

## 2021-09-16 ENCOUNTER — PATIENT MESSAGE (OUTPATIENT)
Dept: INTERNAL MEDICINE | Facility: CLINIC | Age: 21
End: 2021-09-16

## 2021-09-16 ENCOUNTER — OFFICE VISIT (OUTPATIENT)
Dept: INTERNAL MEDICINE | Facility: CLINIC | Age: 21
End: 2021-09-16
Payer: COMMERCIAL

## 2021-09-16 VITALS
DIASTOLIC BLOOD PRESSURE: 72 MMHG | BODY MASS INDEX: 21.55 KG/M2 | OXYGEN SATURATION: 100 % | TEMPERATURE: 100 F | HEIGHT: 66 IN | WEIGHT: 134.06 LBS | SYSTOLIC BLOOD PRESSURE: 118 MMHG | HEART RATE: 86 BPM

## 2021-09-16 DIAGNOSIS — M54.6 CHRONIC BILATERAL THORACIC BACK PAIN: Primary | ICD-10-CM

## 2021-09-16 DIAGNOSIS — G89.29 CHRONIC BILATERAL THORACIC BACK PAIN: Primary | ICD-10-CM

## 2021-09-16 PROCEDURE — 1160F PR REVIEW ALL MEDS BY PRESCRIBER/CLIN PHARMACIST DOCUMENTED: ICD-10-PCS | Mod: CPTII,S$GLB,, | Performed by: PHYSICIAN ASSISTANT

## 2021-09-16 PROCEDURE — 1159F PR MEDICATION LIST DOCUMENTED IN MEDICAL RECORD: ICD-10-PCS | Mod: CPTII,S$GLB,, | Performed by: PHYSICIAN ASSISTANT

## 2021-09-16 PROCEDURE — 1160F RVW MEDS BY RX/DR IN RCRD: CPT | Mod: CPTII,S$GLB,, | Performed by: PHYSICIAN ASSISTANT

## 2021-09-16 PROCEDURE — 3074F PR MOST RECENT SYSTOLIC BLOOD PRESSURE < 130 MM HG: ICD-10-PCS | Mod: CPTII,S$GLB,, | Performed by: PHYSICIAN ASSISTANT

## 2021-09-16 PROCEDURE — 3078F PR MOST RECENT DIASTOLIC BLOOD PRESSURE < 80 MM HG: ICD-10-PCS | Mod: CPTII,S$GLB,, | Performed by: PHYSICIAN ASSISTANT

## 2021-09-16 PROCEDURE — 99999 PR PBB SHADOW E&M-EST. PATIENT-LVL IV: CPT | Mod: PBBFAC,,, | Performed by: PHYSICIAN ASSISTANT

## 2021-09-16 PROCEDURE — 3074F SYST BP LT 130 MM HG: CPT | Mod: CPTII,S$GLB,, | Performed by: PHYSICIAN ASSISTANT

## 2021-09-16 PROCEDURE — 99999 PR PBB SHADOW E&M-EST. PATIENT-LVL IV: ICD-10-PCS | Mod: PBBFAC,,, | Performed by: PHYSICIAN ASSISTANT

## 2021-09-16 PROCEDURE — 3078F DIAST BP <80 MM HG: CPT | Mod: CPTII,S$GLB,, | Performed by: PHYSICIAN ASSISTANT

## 2021-09-16 PROCEDURE — 1159F MED LIST DOCD IN RCRD: CPT | Mod: CPTII,S$GLB,, | Performed by: PHYSICIAN ASSISTANT

## 2021-09-16 PROCEDURE — 99214 OFFICE O/P EST MOD 30 MIN: CPT | Mod: S$GLB,,, | Performed by: PHYSICIAN ASSISTANT

## 2021-09-16 PROCEDURE — 3008F PR BODY MASS INDEX (BMI) DOCUMENTED: ICD-10-PCS | Mod: CPTII,S$GLB,, | Performed by: PHYSICIAN ASSISTANT

## 2021-09-16 PROCEDURE — 99214 PR OFFICE/OUTPT VISIT, EST, LEVL IV, 30-39 MIN: ICD-10-PCS | Mod: S$GLB,,, | Performed by: PHYSICIAN ASSISTANT

## 2021-09-16 PROCEDURE — 3008F BODY MASS INDEX DOCD: CPT | Mod: CPTII,S$GLB,, | Performed by: PHYSICIAN ASSISTANT

## 2021-09-27 ENCOUNTER — PATIENT OUTREACH (OUTPATIENT)
Dept: ADMINISTRATIVE | Facility: OTHER | Age: 21
End: 2021-09-27

## 2021-09-27 ENCOUNTER — TELEPHONE (OUTPATIENT)
Dept: PAIN MEDICINE | Facility: CLINIC | Age: 21
End: 2021-09-27

## 2021-10-04 ENCOUNTER — TELEPHONE (OUTPATIENT)
Dept: PAIN MEDICINE | Facility: CLINIC | Age: 21
End: 2021-10-04

## 2021-10-07 ENCOUNTER — PATIENT MESSAGE (OUTPATIENT)
Dept: FAMILY MEDICINE | Facility: CLINIC | Age: 21
End: 2021-10-07

## 2021-11-15 ENCOUNTER — OFFICE VISIT (OUTPATIENT)
Dept: PRIMARY CARE CLINIC | Facility: CLINIC | Age: 21
End: 2021-11-15
Payer: COMMERCIAL

## 2021-11-15 VITALS
BODY MASS INDEX: 22.52 KG/M2 | OXYGEN SATURATION: 98 % | SYSTOLIC BLOOD PRESSURE: 122 MMHG | WEIGHT: 139.56 LBS | TEMPERATURE: 97 F | HEART RATE: 70 BPM | DIASTOLIC BLOOD PRESSURE: 80 MMHG

## 2021-11-15 DIAGNOSIS — B37.9 YEAST INFECTION: Primary | ICD-10-CM

## 2021-11-15 PROCEDURE — 1159F PR MEDICATION LIST DOCUMENTED IN MEDICAL RECORD: ICD-10-PCS | Mod: CPTII,S$GLB,, | Performed by: PHYSICIAN ASSISTANT

## 2021-11-15 PROCEDURE — 3079F PR MOST RECENT DIASTOLIC BLOOD PRESSURE 80-89 MM HG: ICD-10-PCS | Mod: CPTII,S$GLB,, | Performed by: PHYSICIAN ASSISTANT

## 2021-11-15 PROCEDURE — 99999 PR PBB SHADOW E&M-EST. PATIENT-LVL III: CPT | Mod: PBBFAC,,, | Performed by: PHYSICIAN ASSISTANT

## 2021-11-15 PROCEDURE — 99213 OFFICE O/P EST LOW 20 MIN: CPT | Mod: S$GLB,,, | Performed by: PHYSICIAN ASSISTANT

## 2021-11-15 PROCEDURE — 99999 PR PBB SHADOW E&M-EST. PATIENT-LVL III: ICD-10-PCS | Mod: PBBFAC,,, | Performed by: PHYSICIAN ASSISTANT

## 2021-11-15 PROCEDURE — 3008F BODY MASS INDEX DOCD: CPT | Mod: CPTII,S$GLB,, | Performed by: PHYSICIAN ASSISTANT

## 2021-11-15 PROCEDURE — 1159F MED LIST DOCD IN RCRD: CPT | Mod: CPTII,S$GLB,, | Performed by: PHYSICIAN ASSISTANT

## 2021-11-15 PROCEDURE — 3008F PR BODY MASS INDEX (BMI) DOCUMENTED: ICD-10-PCS | Mod: CPTII,S$GLB,, | Performed by: PHYSICIAN ASSISTANT

## 2021-11-15 PROCEDURE — 1160F RVW MEDS BY RX/DR IN RCRD: CPT | Mod: CPTII,S$GLB,, | Performed by: PHYSICIAN ASSISTANT

## 2021-11-15 PROCEDURE — 3079F DIAST BP 80-89 MM HG: CPT | Mod: CPTII,S$GLB,, | Performed by: PHYSICIAN ASSISTANT

## 2021-11-15 PROCEDURE — 3074F PR MOST RECENT SYSTOLIC BLOOD PRESSURE < 130 MM HG: ICD-10-PCS | Mod: CPTII,S$GLB,, | Performed by: PHYSICIAN ASSISTANT

## 2021-11-15 PROCEDURE — 1160F PR REVIEW ALL MEDS BY PRESCRIBER/CLIN PHARMACIST DOCUMENTED: ICD-10-PCS | Mod: CPTII,S$GLB,, | Performed by: PHYSICIAN ASSISTANT

## 2021-11-15 PROCEDURE — 3074F SYST BP LT 130 MM HG: CPT | Mod: CPTII,S$GLB,, | Performed by: PHYSICIAN ASSISTANT

## 2021-11-15 PROCEDURE — 99213 PR OFFICE/OUTPT VISIT, EST, LEVL III, 20-29 MIN: ICD-10-PCS | Mod: S$GLB,,, | Performed by: PHYSICIAN ASSISTANT

## 2021-11-15 RX ORDER — FLUCONAZOLE 150 MG/1
150 TABLET ORAL ONCE
Qty: 2 TABLET | Refills: 0 | Status: SHIPPED | OUTPATIENT
Start: 2021-11-15 | End: 2021-11-15

## 2021-11-23 ENCOUNTER — OFFICE VISIT (OUTPATIENT)
Dept: PRIMARY CARE CLINIC | Facility: CLINIC | Age: 21
End: 2021-11-23
Payer: COMMERCIAL

## 2021-11-23 VITALS
HEART RATE: 90 BPM | WEIGHT: 140 LBS | SYSTOLIC BLOOD PRESSURE: 122 MMHG | BODY MASS INDEX: 22.6 KG/M2 | DIASTOLIC BLOOD PRESSURE: 82 MMHG | TEMPERATURE: 99 F | OXYGEN SATURATION: 97 %

## 2021-11-23 DIAGNOSIS — R11.0 NAUSEA: ICD-10-CM

## 2021-11-23 DIAGNOSIS — S61.511D LACERATION OF RIGHT WRIST, SUBSEQUENT ENCOUNTER: ICD-10-CM

## 2021-11-23 DIAGNOSIS — S06.0X1S CONCUSSION WITH LOSS OF CONSCIOUSNESS OF 30 MINUTES OR LESS, SEQUELA: Primary | ICD-10-CM

## 2021-11-23 PROCEDURE — 99999 PR PBB SHADOW E&M-EST. PATIENT-LVL III: ICD-10-PCS | Mod: PBBFAC,,, | Performed by: PHYSICIAN ASSISTANT

## 2021-11-23 PROCEDURE — 99213 OFFICE O/P EST LOW 20 MIN: CPT | Mod: S$GLB,,, | Performed by: PHYSICIAN ASSISTANT

## 2021-11-23 PROCEDURE — 99999 PR PBB SHADOW E&M-EST. PATIENT-LVL III: CPT | Mod: PBBFAC,,, | Performed by: PHYSICIAN ASSISTANT

## 2021-11-23 PROCEDURE — 99213 PR OFFICE/OUTPT VISIT, EST, LEVL III, 20-29 MIN: ICD-10-PCS | Mod: S$GLB,,, | Performed by: PHYSICIAN ASSISTANT

## 2021-11-23 RX ORDER — ONDANSETRON 8 MG/1
8 TABLET, ORALLY DISINTEGRATING ORAL 2 TIMES DAILY
Qty: 20 TABLET | Refills: 0 | Status: SHIPPED | OUTPATIENT
Start: 2021-11-23 | End: 2022-01-21

## 2021-12-01 ENCOUNTER — OFFICE VISIT (OUTPATIENT)
Dept: PRIMARY CARE CLINIC | Facility: CLINIC | Age: 21
End: 2021-12-01
Payer: COMMERCIAL

## 2021-12-01 DIAGNOSIS — Z48.02 ENCOUNTER FOR REMOVAL OF SUTURES: Primary | ICD-10-CM

## 2021-12-01 PROCEDURE — 99213 OFFICE O/P EST LOW 20 MIN: CPT | Mod: S$GLB,,, | Performed by: FAMILY MEDICINE

## 2021-12-01 PROCEDURE — 99213 PR OFFICE/OUTPT VISIT, EST, LEVL III, 20-29 MIN: ICD-10-PCS | Mod: S$GLB,,, | Performed by: FAMILY MEDICINE

## 2021-12-01 PROCEDURE — 99999 PR PBB SHADOW E&M-EST. PATIENT-LVL I: ICD-10-PCS | Mod: PBBFAC,,, | Performed by: FAMILY MEDICINE

## 2021-12-01 PROCEDURE — 99999 PR PBB SHADOW E&M-EST. PATIENT-LVL I: CPT | Mod: PBBFAC,,, | Performed by: FAMILY MEDICINE

## 2021-12-24 ENCOUNTER — HOSPITAL ENCOUNTER (EMERGENCY)
Facility: OTHER | Age: 21
Discharge: LEFT BEFORE DISCHARGE | End: 2021-12-24
Payer: COMMERCIAL

## 2021-12-24 VITALS
BODY MASS INDEX: 21.69 KG/M2 | HEIGHT: 66 IN | RESPIRATION RATE: 16 BRPM | DIASTOLIC BLOOD PRESSURE: 77 MMHG | HEART RATE: 71 BPM | TEMPERATURE: 98 F | WEIGHT: 135 LBS | SYSTOLIC BLOOD PRESSURE: 117 MMHG | OXYGEN SATURATION: 97 %

## 2021-12-24 LAB
CTP QC/QA: YES
SARS-COV-2 RDRP RESP QL NAA+PROBE: POSITIVE

## 2021-12-24 PROCEDURE — U0002 COVID-19 LAB TEST NON-CDC: HCPCS | Performed by: EMERGENCY MEDICINE

## 2021-12-24 PROCEDURE — 99900041 HC LEFT WITHOUT BEING SEEN- EMERGENCY

## 2021-12-26 ENCOUNTER — PATIENT MESSAGE (OUTPATIENT)
Dept: ADMINISTRATIVE | Facility: OTHER | Age: 21
End: 2021-12-26
Payer: COMMERCIAL

## 2022-01-10 ENCOUNTER — PATIENT MESSAGE (OUTPATIENT)
Dept: ADMINISTRATIVE | Facility: OTHER | Age: 22
End: 2022-01-10
Payer: COMMERCIAL

## 2022-01-18 ENCOUNTER — PATIENT OUTREACH (OUTPATIENT)
Dept: ADMINISTRATIVE | Facility: OTHER | Age: 22
End: 2022-01-18
Payer: COMMERCIAL

## 2022-01-21 ENCOUNTER — OFFICE VISIT (OUTPATIENT)
Dept: OBSTETRICS AND GYNECOLOGY | Facility: CLINIC | Age: 22
End: 2022-01-21
Payer: COMMERCIAL

## 2022-01-21 VITALS
DIASTOLIC BLOOD PRESSURE: 80 MMHG | WEIGHT: 137.88 LBS | HEIGHT: 66 IN | BODY MASS INDEX: 22.16 KG/M2 | SYSTOLIC BLOOD PRESSURE: 124 MMHG

## 2022-01-21 DIAGNOSIS — Z01.419 WELL WOMAN EXAM WITH ROUTINE GYNECOLOGICAL EXAM: Primary | ICD-10-CM

## 2022-01-21 DIAGNOSIS — N76.0 RECURRENT VAGINITIS: ICD-10-CM

## 2022-01-21 DIAGNOSIS — N89.8 VAGINAL DISCHARGE: ICD-10-CM

## 2022-01-21 PROCEDURE — 1160F PR REVIEW ALL MEDS BY PRESCRIBER/CLIN PHARMACIST DOCUMENTED: ICD-10-PCS | Mod: CPTII,S$GLB,, | Performed by: OBSTETRICS & GYNECOLOGY

## 2022-01-21 PROCEDURE — 87491 CHLMYD TRACH DNA AMP PROBE: CPT | Mod: 59 | Performed by: OBSTETRICS & GYNECOLOGY

## 2022-01-21 PROCEDURE — 1160F RVW MEDS BY RX/DR IN RCRD: CPT | Mod: CPTII,S$GLB,, | Performed by: OBSTETRICS & GYNECOLOGY

## 2022-01-21 PROCEDURE — 3074F SYST BP LT 130 MM HG: CPT | Mod: CPTII,S$GLB,, | Performed by: OBSTETRICS & GYNECOLOGY

## 2022-01-21 PROCEDURE — 3008F BODY MASS INDEX DOCD: CPT | Mod: CPTII,S$GLB,, | Performed by: OBSTETRICS & GYNECOLOGY

## 2022-01-21 PROCEDURE — 99385 PREV VISIT NEW AGE 18-39: CPT | Mod: S$GLB,,, | Performed by: OBSTETRICS & GYNECOLOGY

## 2022-01-21 PROCEDURE — 1159F PR MEDICATION LIST DOCUMENTED IN MEDICAL RECORD: ICD-10-PCS | Mod: CPTII,S$GLB,, | Performed by: OBSTETRICS & GYNECOLOGY

## 2022-01-21 PROCEDURE — 3079F PR MOST RECENT DIASTOLIC BLOOD PRESSURE 80-89 MM HG: ICD-10-PCS | Mod: CPTII,S$GLB,, | Performed by: OBSTETRICS & GYNECOLOGY

## 2022-01-21 PROCEDURE — 99385 PR PREVENTIVE VISIT,NEW,18-39: ICD-10-PCS | Mod: S$GLB,,, | Performed by: OBSTETRICS & GYNECOLOGY

## 2022-01-21 PROCEDURE — 3008F PR BODY MASS INDEX (BMI) DOCUMENTED: ICD-10-PCS | Mod: CPTII,S$GLB,, | Performed by: OBSTETRICS & GYNECOLOGY

## 2022-01-21 PROCEDURE — 87801 DETECT AGNT MULT DNA AMPLI: CPT | Performed by: OBSTETRICS & GYNECOLOGY

## 2022-01-21 PROCEDURE — 99999 PR PBB SHADOW E&M-EST. PATIENT-LVL III: CPT | Mod: PBBFAC,,, | Performed by: OBSTETRICS & GYNECOLOGY

## 2022-01-21 PROCEDURE — 87481 CANDIDA DNA AMP PROBE: CPT | Mod: 59 | Performed by: OBSTETRICS & GYNECOLOGY

## 2022-01-21 PROCEDURE — 99999 PR PBB SHADOW E&M-EST. PATIENT-LVL III: ICD-10-PCS | Mod: PBBFAC,,, | Performed by: OBSTETRICS & GYNECOLOGY

## 2022-01-21 PROCEDURE — 88175 CYTOPATH C/V AUTO FLUID REDO: CPT | Performed by: OBSTETRICS & GYNECOLOGY

## 2022-01-21 PROCEDURE — 87591 N.GONORRHOEAE DNA AMP PROB: CPT | Performed by: OBSTETRICS & GYNECOLOGY

## 2022-01-21 PROCEDURE — 3074F PR MOST RECENT SYSTOLIC BLOOD PRESSURE < 130 MM HG: ICD-10-PCS | Mod: CPTII,S$GLB,, | Performed by: OBSTETRICS & GYNECOLOGY

## 2022-01-21 PROCEDURE — 3079F DIAST BP 80-89 MM HG: CPT | Mod: CPTII,S$GLB,, | Performed by: OBSTETRICS & GYNECOLOGY

## 2022-01-21 PROCEDURE — 1159F MED LIST DOCD IN RCRD: CPT | Mod: CPTII,S$GLB,, | Performed by: OBSTETRICS & GYNECOLOGY

## 2022-01-21 NOTE — PATIENT INSTRUCTIONS
Patient Education       Gynecological Exam   About this topic   A gynecologic exam is sometimes called a pelvic exam. Talk with your doctor to decide when and how often you need to have pelvic exams. You may have a pelvic exam:  · As a part of a health check-up  · If you have symptoms, like vaginal discharge or pain  · To do a Pap or HPV test to screen for cervical cancer. HPV is short for human papillomavirus, which is a virus that causes cervical cancer.  · If you have a family history of cervical cancer  · If your sexual partner has a sexually-transmitted disease  · If you have bleeding between periods  · Before your doctor orders certain kinds of birth control  · At the start of a pregnancy  Your doctor will take your history. Talk to the doctor about:  · All the drugs you are taking. Be sure to include all prescription and over-the-counter (OTC) drugs and herbal supplements. Tell the doctor about any drug allergy. Bring a list of drugs you take with you.  · If you are pregnant or think you might be pregnant  · If you have had a baby and how the baby was delivered  · If you have had a miscarriage or an   · If you have your menstrual period  · If you are sexually active  · Any vaccinations you have had, such as the HPV vaccine  Be sure to let the doctor know if you have a history of an abnormal Pap test. Also, tell the doctor about:  · Past cervical procedures  · HPV or other sexually-transmitted diseases  · Vaginal secretions and infections  · Past surgery, radiation, or chemo  Your gynecologic exam is also a good time to talk with your doctor about things like:  · Birth control  · Problems with your menstrual cycle  · If you are trying to get pregnant or are having trouble getting pregnant  · Sex and sexuality  · Gender identity  · Your emotional health  · Changes in appetite  · If you dont feel safe in a relationship  · Questions about taking hormones  · Sexually-transmitted infections  · Shots to  prevent infections  · If you use tobacco, alcohol, or illegal substances  General   The doctor will ask you to lie on an exam table and put your feet in foot holders. The exam may be a little uncomfortable but should not hurt. If it would make you more comfortable, ask to have a nurse or family member with you during the exam. Remember to breathe and try to relax your stomach, butt, and leg muscles. There are a few parts to a pelvic exam:  · External exam - The doctor looks at your vulva and the opening of your vagina.  · Internal exam with a speculum - The doctor puts a special tool called a speculum into your vagina. The speculum helps keep your vagina open so the doctor is able to see your cervix. The doctor may use swabs to collect a sample of cells from your cervix during this part of the exam.  · Bimanual exam - The doctor will gently place 1 or 2 gloved fingers into your vagina and use the other hand to press slightly on your lower belly. This lets the doctor check your uterus and ovaries.  · Rectovaginal exam - The doctor may place a gloved finger in your rectum to check the muscles between your vagina and your anus. The doctor may also place a finger in your vagina at the same time.  The doctor may also do a breast exam as a part of your gynecologic exam. This is to look for lumps, cysts, or drainage. The doctor may do a test to check the cells of your cervix for:  · Cancer cells  · Cells that are not normal and may lead to cancer  · Changes in your cervix  · Swelling or infections  Talk to the doctor about when you can get any test results.  Where can I learn more?   ACOG  https://www.acog.org/womens-health/faqs/your-first-gynecologic-visit   UptoDate  https://www.etouchesdate.com/contents/cervical-cancer-screening-beyond-the-basics   Last Reviewed Date   2021-03-31  Consumer Information Use and Disclaimer   This information is not specific medical advice and does not replace information you receive from your  health care provider. This is only a brief summary of general information. It does NOT include all information about conditions, illnesses, injuries, tests, procedures, treatments, therapies, discharge instructions or life-style choices that may apply to you. You must talk with your health care provider for complete information about your health and treatment options. This information should not be used to decide whether or not to accept your health care providers advice, instructions or recommendations. Only your health care provider has the knowledge and training to provide advice that is right for you.  Copyright   Copyright © 2021 UpToDate, Inc. and its affiliates and/or licensors. All rights reserved.        Patient Education       Vaginitis   About this topic   Vaginitis is the redness and swelling of the vagina and the area outside of the vagina. The condition may also be called vulvovaginitis. Your vagina joins the uterus or womb with the outside of your body.  What are the causes?   Vaginitis may be caused by:  · An infection  · Irritation from soaps, bubble baths, douches, or other chemicals  · Not being clean  · A change in hormones with pregnancy, breastfeeding, or menopause  · Drugs like antibiotics or steroids  · Sexual intercourse  · Diabetes that is not controlled  · A weak immune system  What can make this more likely to happen?   Vaginitis is common in women. You are more likely to have vaginitis if you:  · Take bubble baths  · Use feminine hygiene sprays or perfumed soaps  · Wear clothes that are tight or wet  · Do not dry your vaginal area well after taking a bath  · Have sex without condoms  What are the main signs?   Your vaginal area may itch, burn, or feel painful. You may have some kind of discharge from your vagina or there may be none at all. You may notice redness, swelling, or cracks of the skin in the vaginal area. There may be a bad smell coming from your vaginal area. You may feel  pain during sex.  How does the doctor diagnose this health problem?   Your doctor will take your history. Your doctor will do an exam of the pelvic area. Based on the problem, your doctor may need to do a pelvic exam. The doctor may order:  · Lab tests  · Urine tests  How does the doctor treat this health problem?   Your care depends on the cause of your vaginitis.  What drugs may be needed?   The doctor may order drugs to:  · Help with pain and swelling  · Fight an infection  What can be done to prevent this health problem?   · Do not wear clothes that may hold moisture, such as nylon or polyester. Wear cotton underwear.  · Wear loose-fitting pants or other clothes. Avoid wearing underwear while you sleep. This can help keep your vaginal area dry.  · Clean your vaginal area with water. If you use soap, be sure it is mild and rinse well. Pat the area dry with a clean towel. Do not use bubble baths or douche.  · Wipe from front to back after using the toilet.  · If you have sex, use latex condoms each time to lower spread of infection. Avoid multiple sex partners.  Where can I learn more?   American Academy of Family Physicians  https://familydoctor.org/condition/vaginitis/   American Congress of Obstetricians and Gynecologists  https://www.acog.org/Patients/FAQs/Vaginitis   Centers for Disease Control and Prevention  https://www.cdc.gov/fungal/diseases/candidiasis/genital/   Centers for Disease Control and Prevention  https://www.cdc.gov/std/bv/   Last Reviewed Date   2019-10-18  Consumer Information Use and Disclaimer   This information is not specific medical advice and does not replace information you receive from your health care provider. This is only a brief summary of general information. It does NOT include all information about conditions, illnesses, injuries, tests, procedures, treatments, therapies, discharge instructions or life-style choices that may apply to you. You must talk with your health care  provider for complete information about your health and treatment options. This information should not be used to decide whether or not to accept your health care providers advice, instructions or recommendations. Only your health care provider has the knowledge and training to provide advice that is right for you.  Copyright   Copyright © 2021 [employer] and its affiliates and/or licensors. All rights reserved.

## 2022-01-21 NOTE — PROGRESS NOTES
"History & Physical  Gynecology      SUBJECTIVE:     Chief Complaint: Vaginal Discharge (/), vaginal odor, and vaginal irritation       History of Present Illness:  Annual Exam-Premenopausal  Ms. Noe is a 20 y/o female who presents for annual exam. The patient reports that over the past "few months" she has noticed recurrent either BV or yeast. She has been treated with Diflucan and Metrogel with improvement but the discharge would return. has no complaints today. The patient is not currently sexually active. GYN screening history: no prior history of gyn screening tests. The patient wears seatbelts: yes. The patient participates in regular exercise: no. Has the patient ever been transfused or tattooed?: yes. The patient reports that there is not domestic violence in her life.      Review of patient's allergies indicates:  No Known Allergies    Past Medical History:   Diagnosis Date    Allergy     Asthma      History reviewed. No pertinent surgical history.  OB History    No obstetric history on file.       Family History   Problem Relation Age of Onset    Asthma Mother     Hypertension Mother     ADD / ADHD Father     Asthma Maternal Grandmother     Cancer Maternal Grandmother         uterus     Heart disease Maternal Grandmother     Cancer Maternal Grandfather         prostates     Cancer Paternal Grandmother         uterus      Social History     Tobacco Use    Smoking status: Never Smoker    Smokeless tobacco: Never Used   Substance Use Topics    Alcohol use: No    Drug use: No       Current Outpatient Medications   Medication Sig    albuterol (PROAIR HFA) 90 mcg/actuation inhaler Inhale 2 puffs into the lungs every 4 (four) hours as needed. (Patient not taking: No sig reported)    boric acid (BORIC ACID) vaginal suppository Place 1 each (650 mg total) vaginally every evening. for 7 days    fluticasone propionate (FLONASE) 50 mcg/actuation nasal spray 1 spray (50 mcg total) by Each Nare route " once daily. (Patient not taking: No sig reported)    ibuprofen (ADVIL,MOTRIN) 400 MG tablet TAKE 1 TABLET(400 MG) BY MOUTH EVERY 6 HOURS AS NEEDED (Patient not taking: No sig reported)    levocetirizine (XYZAL) 5 MG tablet Take 1 tablet (5 mg total) by mouth every evening.    ondansetron (ZOFRAN-ODT) 8 MG TbDL Take 1 tablet (8 mg total) by mouth 2 (two) times daily.     No current facility-administered medications for this visit.         Review of Systems:  Review of Systems   Constitutional: Negative for chills and fever.   Eyes: Negative for visual disturbance.   Respiratory: Negative for cough and wheezing.    Cardiovascular: Negative for chest pain and palpitations.   Gastrointestinal: Negative for abdominal pain, nausea and vomiting.   Genitourinary: Positive for vaginal discharge and vaginal odor. Negative for dysuria, frequency, hematuria, pelvic pain, vaginal bleeding and vaginal pain.   Neurological: Negative for headaches.   Psychiatric/Behavioral: Negative for depression.        OBJECTIVE:     Physical Exam:  Physical Exam  Vitals and nursing note reviewed. Exam conducted with a chaperone present.   Constitutional:       Appearance: She is well-developed and well-nourished.   Cardiovascular:      Rate and Rhythm: Normal rate.   Pulmonary:      Effort: Pulmonary effort is normal. No respiratory distress.   Chest:   Breasts: No discharge from either breast. No breast swelling, tenderness and bleeding. Breasts are symmetrical.       Abdominal:      General: There is no distension.      Palpations: Abdomen is soft.      Tenderness: There is no abdominal tenderness.   Genitourinary:     Vagina: Vaginal discharge present.      Comments: Thick yellow discharge  Skin:     General: Skin is warm and dry.   Neurological:      Mental Status: She is alert and oriented to person, place, and time.   Psychiatric:         Mood and Affect: Mood and affect normal.       ASSESSMENT:       ICD-10-CM ICD-9-CM    1. Well  woman exam with routine gynecological exam  Z01.419 V72.31 Liquid-Based Pap Smear, Screening   2. Vaginal discharge  N89.8 623.5 Vaginosis Screen by DNA Probe      C. trachomatis/N. gonorrhoeae by AMP DNA      boric acid (BORIC ACID) vaginal suppository   3. Recurrent vaginitis  N76.0 616.10 boric acid (BORIC ACID) vaginal suppository        Plan:      Jesica was seen today for vaginal discharge, vaginal odor and vaginal irritation.    Diagnoses and all orders for this visit:    Well woman exam with routine gynecological exam  -     Liquid-Based Pap Smear, Screening    Vaginal discharge  -     Vaginosis Screen by DNA Probe  -     C. trachomatis/N. gonorrhoeae by AMP DNA  -     boric acid (BORIC ACID) vaginal suppository; Place 1 each (650 mg total) vaginally every evening. for 7 days    Recurrent vaginitis  -     boric acid (BORIC ACID) vaginal suppository; Place 1 each (650 mg total) vaginally every evening. for 7 days  - Discussed with patient how douching/body wash/scented soaps/bubble baths can shift her vaginal teddy and natural vaginal pH which can cause overgrowth of yeast or gardnerella which is the bacteria that causes BV. Discussed with patient to use only mild, unscented soaps such as Dove unscented and Dial and water to wash her vagina.       Orders Placed This Encounter   Procedures    Vaginosis Screen by DNA Probe    C. trachomatis/N. gonorrhoeae by AMP DNA       Follow up in about 1 year (around 1/21/2023), or if symptoms worsen or fail to improve.    Counseling time: 30 minutes    Jayla Stout

## 2022-01-25 LAB
BACTERIAL VAGINOSIS DNA: POSITIVE
CANDIDA GLABRATA DNA: NEGATIVE
CANDIDA KRUSEI DNA: NEGATIVE
CANDIDA RRNA VAG QL PROBE: POSITIVE
T VAGINALIS RRNA GENITAL QL PROBE: NEGATIVE

## 2022-01-26 LAB
C TRACH DNA SPEC QL NAA+PROBE: NOT DETECTED
N GONORRHOEA DNA SPEC QL NAA+PROBE: NOT DETECTED

## 2022-01-27 LAB
FINAL PATHOLOGIC DIAGNOSIS: NORMAL
Lab: NORMAL

## 2022-02-22 ENCOUNTER — PATIENT MESSAGE (OUTPATIENT)
Dept: RESEARCH | Facility: HOSPITAL | Age: 22
End: 2022-02-22
Payer: COMMERCIAL

## 2022-05-23 ENCOUNTER — HOSPITAL ENCOUNTER (EMERGENCY)
Facility: OTHER | Age: 22
Discharge: HOME | End: 2022-05-23
Attending: EMERGENCY MEDICINE
Payer: COMMERCIAL

## 2022-05-23 VITALS
OXYGEN SATURATION: 96 % | HEART RATE: 75 BPM | DIASTOLIC BLOOD PRESSURE: 59 MMHG | HEIGHT: 67 IN | RESPIRATION RATE: 15 BRPM | WEIGHT: 132.06 LBS | BODY MASS INDEX: 20.73 KG/M2 | TEMPERATURE: 98 F | SYSTOLIC BLOOD PRESSURE: 100 MMHG

## 2022-05-23 DIAGNOSIS — T78.40XA ALLERGIC REACTION, INITIAL ENCOUNTER: Primary | ICD-10-CM

## 2022-05-23 LAB
B-HCG UR QL: NEGATIVE
CTP QC/QA: YES
HCV AB SERPL QL IA: NEGATIVE
HIV 1+2 AB+HIV1 P24 AG SERPL QL IA: NEGATIVE

## 2022-05-23 PROCEDURE — 81025 URINE PREGNANCY TEST: CPT | Performed by: PHYSICIAN ASSISTANT

## 2022-05-23 PROCEDURE — 86803 HEPATITIS C AB TEST: CPT | Performed by: EMERGENCY MEDICINE

## 2022-05-23 PROCEDURE — 99283 EMERGENCY DEPT VISIT LOW MDM: CPT

## 2022-05-23 PROCEDURE — 87389 HIV-1 AG W/HIV-1&-2 AB AG IA: CPT | Performed by: EMERGENCY MEDICINE

## 2022-05-23 RX ORDER — TRIAMCINOLONE ACETONIDE 1 MG/G
CREAM TOPICAL 2 TIMES DAILY
Qty: 15 G | Refills: 1 | Status: SHIPPED | OUTPATIENT
Start: 2022-05-23 | End: 2022-06-02

## 2022-05-24 NOTE — ED PROVIDER NOTES
Encounter Date: 5/23/2022    SCRIBE #1 NOTE: I, Lalitha Manzano, am scribing for, and in the presence of, Parish Christianson II, MD.       History     Chief Complaint   Patient presents with    Allergic Reaction     From body cream x 1 week, benedryl not working     Time seen by provider: 9:48 PM    This is a 21 y.o. female who presents with complaint of rash to the bilateral shoulders x 1 week. Patient reports that prior to the rash she has used a shea butter exfoliating cream. She explains that she used the exfoliating cream all over her body, but she did not fully was the cream off of her shoulders. The rash is localized to the shoulders. She has been taking benadryl and using hydrocortisone cream with some relief. For the first several days, she states that she was applying alcohol to the affected area, but she is no longer doing so. She has no other complaints at this time. She has no significant PMHx and does not take any prescription medications daily. This is the extent of the patient's complaints at this time.    The history is provided by the patient.     Review of patient's allergies indicates:  No Known Allergies  Past Medical History:   Diagnosis Date    Allergy     Asthma      No past surgical history on file.  Family History   Problem Relation Age of Onset    Asthma Mother     Hypertension Mother     ADD / ADHD Father     Asthma Maternal Grandmother     Cancer Maternal Grandmother         uterus     Heart disease Maternal Grandmother     Cancer Maternal Grandfather         prostates     Cancer Paternal Grandmother         uterus      Social History     Tobacco Use    Smoking status: Never Smoker    Smokeless tobacco: Never Used   Substance Use Topics    Alcohol use: No    Drug use: No     Review of Systems   Constitutional: Negative for fever.   HENT: Negative for sore throat.    Respiratory: Negative for shortness of breath.    Cardiovascular: Negative for chest pain.   Gastrointestinal:  Negative for nausea.   Genitourinary: Negative for dysuria.   Musculoskeletal: Negative for back pain.   Skin: Positive for rash (bilateral shoulder).   Neurological: Negative for weakness.   Hematological: Does not bruise/bleed easily.       Physical Exam     Initial Vitals [05/23/22 2124]   BP Pulse Resp Temp SpO2   (!) 100/59 75 15 98 °F (36.7 °C) 96 %      MAP       --         Physical Exam    Nursing note and vitals reviewed.  Constitutional: She appears well-developed and well-nourished. She is not diaphoretic. No distress.   HENT:   Head: Normocephalic and atraumatic.   No facial or intraoral lesions.   Eyes: EOM are normal.   Neck:   Normal range of motion.  Pulmonary/Chest: No respiratory distress.   Musculoskeletal:         General: Normal range of motion.      Cervical back: Normal range of motion.     Neurological: She is alert and oriented to person, place, and time.   Skin: Skin is dry.   Cluster of erythematous papular lesions over bilateral deltoid region. No cellulitis or other acute rash.         ED Course   Procedures  Labs Reviewed   HIV 1 / 2 ANTIBODY    Narrative:     Release to patient->Immediate   HEPATITIS C ANTIBODY    Narrative:     Release to patient->Immediate   POCT URINE PREGNANCY          Imaging Results    None          Medications - No data to display  Medical Decision Making:   History:   Old Medical Records: I decided to obtain old medical records.  Clinical Tests:   Lab Tests: Ordered and Reviewed  Healthy female who presents with rash on bilateral upper arms in an area where she applied an exfoliating cream.  States she washed it off the rest of her arms but not from the area where there is a rash.  She got a prescription cream from her mom, which they think is hydrocortisone which seemed to help but she has run out of this.  No other rash.  On exam she has fine papular rash consistent with contact dermatitis.  New prescription for anti-inflammatories provided.  Recommend p.o.  or topical Benadryl.  Encouraged follow-up with primary care or Dermatology, especially symptoms persist.  Return precautions discussed for the interim.        Scribe Attestation:   Scribe #1: I performed the above scribed service and the documentation accurately describes the services I performed. I attest to the accuracy of the note.              Physician Attestation for Scribe: I, TLH, reviewed documentation as scribed in my presence, which is both accurate and complete.    Clinical Impression:   Final diagnoses:  [T78.40XA] Allergic reaction, initial encounter (Primary)          ED Disposition Condition    Discharge Stable        ED Prescriptions     Medication Sig Dispense Start Date End Date Auth. Provider    triamcinolone acetonide 0.1% (KENALOG) 0.1 % cream Apply topically 2 (two) times daily. for 10 days 15 g 5/23/2022 6/2/2022 Parish Christianson II, MD        Follow-up Information     Follow up With Specialties Details Why Contact Info    Cristina Baca MD Family Medicine In 4 days  [address] [facility] [address]  [phone]      [facility] DERMATOLOGY Dermatology Schedule an appointment as soon as possible for a visit   [address] [facility] [id number] [phone]           Parish Christianson II, MD  05/24/22 2909

## 2022-05-24 NOTE — FIRST PROVIDER EVALUATION
Emergency Department TeleTriage Encounter Note      CHIEF COMPLAINT    Chief Complaint   Patient presents with    Allergic Reaction     From body cream x 1 week, benedryl not working       VITAL SIGNS   Initial Vitals [05/23/22 2124]   BP Pulse Resp Temp SpO2   (!) 100/59 75 15 98 °F (36.7 °C) 96 %      MAP       --            ALLERGIES    Review of patient's allergies indicates:  No Known Allergies    PROVIDER TRIAGE NOTE  This is a teletriage evaluation of a 21 y.o. female presenting to the ED with c/o rash to BUE after using cream. Still present despite dose of benadryl, sometimes pruritic. No other system involvement. Denies need for itch relief at this time.     PE: papules to bilat shoulders appreciated. Non-toxic/well-appearing. No respiratory distress, speaks in full sentences without issue. No active emesis nor cough. Normal eye contact and mentation.     Plan; Further/augmented workup at discretion of examining provider.     All ED beds are full at present; patient notified of this status.  Patient seen and medically screened by AGNES via teletriage. Orders initiated at triage to expedite care.  Patient is stable and will be placed in an ED bed when available.  Care will be transferred to an alternate provider when patient has been placed in an Exam Room further exam, additional orders, and disposition.         ORDERS  Labs Reviewed   POCT URINE PREGNANCY       ED Orders (720h ago, onward)    Start Ordered     Status Ordering Provider    05/23/22 2132 05/23/22 2131  POCT urine pregnancy  Once         Ordered CRISTIANE GUARDADO            Virtual Visit Note: The provider triage portion of this emergency department evaluation and documentation was performed via [employer], a HIPAA-compliant telemedicine application, in concert with a tele-presenter in the room. A face to face patient evaluation with one of my colleagues will occur once the patient is placed in an emergency department room.      DISCLAIMER:  This note was prepared with [employer] voice recognition transcription software. Garbled syntax, mangled pronouns, and other bizarre constructions may be attributed to that software system.

## 2022-05-24 NOTE — ED TRIAGE NOTES
Pt arrived with c/o allergic reaction to bilateral shoulders after using body cream about 1 week ago.  Rash noted to bilateral shoulders.  Took 2 Benadryl when the rash first occurred.  States her mother gave her a cream to use but she doesn't remember the name of it.  Reports the rash is itchy and painful, feels like a bruise.  Respirations even and unlabored.  Speaking in full sentences.  aao x4.

## 2022-08-18 ENCOUNTER — OFFICE VISIT (OUTPATIENT)
Dept: GASTROENTEROLOGY | Facility: CLINIC | Age: 22
End: 2022-08-18
Payer: COMMERCIAL

## 2022-08-18 VITALS
SYSTOLIC BLOOD PRESSURE: 115 MMHG | HEIGHT: 67 IN | BODY MASS INDEX: 20.76 KG/M2 | HEART RATE: 52 BPM | WEIGHT: 132.25 LBS | DIASTOLIC BLOOD PRESSURE: 72 MMHG

## 2022-08-18 DIAGNOSIS — K21.9 GASTROESOPHAGEAL REFLUX DISEASE, UNSPECIFIED WHETHER ESOPHAGITIS PRESENT: Primary | ICD-10-CM

## 2022-08-18 PROCEDURE — 3008F BODY MASS INDEX DOCD: CPT | Mod: CPTII,S$GLB,, | Performed by: INTERNAL MEDICINE

## 2022-08-18 PROCEDURE — 1159F MED LIST DOCD IN RCRD: CPT | Mod: CPTII,S$GLB,, | Performed by: INTERNAL MEDICINE

## 2022-08-18 PROCEDURE — 1159F PR MEDICATION LIST DOCUMENTED IN MEDICAL RECORD: ICD-10-PCS | Mod: CPTII,S$GLB,, | Performed by: INTERNAL MEDICINE

## 2022-08-18 PROCEDURE — 99999 PR PBB SHADOW E&M-EST. PATIENT-LVL IV: ICD-10-PCS | Mod: PBBFAC,,, | Performed by: INTERNAL MEDICINE

## 2022-08-18 PROCEDURE — 3008F PR BODY MASS INDEX (BMI) DOCUMENTED: ICD-10-PCS | Mod: CPTII,S$GLB,, | Performed by: INTERNAL MEDICINE

## 2022-08-18 PROCEDURE — 99203 PR OFFICE/OUTPT VISIT, NEW, LEVL III, 30-44 MIN: ICD-10-PCS | Mod: S$GLB,,, | Performed by: INTERNAL MEDICINE

## 2022-08-18 PROCEDURE — 3078F DIAST BP <80 MM HG: CPT | Mod: CPTII,S$GLB,, | Performed by: INTERNAL MEDICINE

## 2022-08-18 PROCEDURE — 3078F PR MOST RECENT DIASTOLIC BLOOD PRESSURE < 80 MM HG: ICD-10-PCS | Mod: CPTII,S$GLB,, | Performed by: INTERNAL MEDICINE

## 2022-08-18 PROCEDURE — 99203 OFFICE O/P NEW LOW 30 MIN: CPT | Mod: S$GLB,,, | Performed by: INTERNAL MEDICINE

## 2022-08-18 PROCEDURE — 3074F PR MOST RECENT SYSTOLIC BLOOD PRESSURE < 130 MM HG: ICD-10-PCS | Mod: CPTII,S$GLB,, | Performed by: INTERNAL MEDICINE

## 2022-08-18 PROCEDURE — 99999 PR PBB SHADOW E&M-EST. PATIENT-LVL IV: CPT | Mod: PBBFAC,,, | Performed by: INTERNAL MEDICINE

## 2022-08-18 PROCEDURE — 3074F SYST BP LT 130 MM HG: CPT | Mod: CPTII,S$GLB,, | Performed by: INTERNAL MEDICINE

## 2022-08-18 RX ORDER — FAMOTIDINE 20 MG/1
20 TABLET, FILM COATED ORAL 2 TIMES DAILY
Qty: 60 TABLET | Refills: 11 | Status: SHIPPED | OUTPATIENT
Start: 2022-08-18 | End: 2023-04-13 | Stop reason: SDUPTHER

## 2022-08-18 NOTE — PATIENT INSTRUCTIONS
-Avoid acid triggering foods such as tomato based foods, citrus foods, spicey foods, coffee and chocolate   -Consider H2 blocker as needed (pepcid)  -EGD if symptoms remain

## 2022-08-18 NOTE — PROGRESS NOTES
[facility]   Clinic Note              Patient Name: Jesica Noe  Age: 21 y.o.  Sex: female  MRN: [id number]    TODAY'S DATE:  8/18/2022  REFERRING PROVIDER:  Self, Aaareferral     Diagnosis: GERD    HPI:   Jesica Noe is a 21 y.o. female with a history of asthma who was referred for evaluation and treatment of GERD.    Patient notes that majority of her family suffers from acid reflux.  She mentioned reflux to her father, who noted that given symptoms were keeping her up at night she should be seen by a doctor.  She notes reflux for 3-4 years.  Some nausea, no emesis.  Some nocturnal waking 2-3 times month.  Not on medications, has tried tums in the past.  Her diet is unstructured, not too much fried foods but fair amount of takeout.  Tacos last night made her reflux worse, as did multiple margaritas.    PRIOR ENDOSCOPY:  -Colonoscopy: --  -EGD - -      ROS: Negative other than above      Review of patient's allergies indicates:  No Known Allergies    Outpatient Medications Marked as Taking for the 8/18/22 encounter (Office Visit) with Casper Styles MD   Medication Sig Dispense Refill    albuterol (PROAIR HFA) 90 mcg/actuation inhaler Inhale 2 puffs into the lungs every 4 (four) hours as needed. 1 Inhaler 12    fluticasone propionate (FLONASE) 50 mcg/actuation nasal spray 1 spray (50 mcg total) by Each Nare route once daily. 16 g 5    ibuprofen (ADVIL,MOTRIN) 400 MG tablet TAKE 1 TABLET(400 MG) BY MOUTH EVERY 6 HOURS AS NEEDED 385 tablet 2       Past Medical History:   Diagnosis Date    Allergy     Asthma        No past surgical history on file.    Family History   Problem Relation Age of Onset    Asthma Mother     Hypertension Mother     ADD / ADHD Father     Asthma Maternal Grandmother     Cancer Maternal Grandmother         uterus     Heart disease Maternal Grandmother     Cancer Maternal Grandfather         prostates     Cancer Paternal Grandmother         uterus     Colon  "cancer Paternal Grandfather     Esophageal cancer Neg Hx        Social History     Socioeconomic History    Marital status: Single   Tobacco Use    Smoking status: Never Smoker    Smokeless tobacco: Never Used   Substance and Sexual Activity    Alcohol use: No    Drug use: No    Sexual activity: Yes     Partners: Male     Birth control/protection: Injection         Vital Signs:  /72   Pulse (!) 52   Ht 5' 7" (1.702 m)   Wt 60 kg (132 lb 4.4 oz)   BMI 20.72 kg/m²      General: Awoke and orientedx3, in no acute distress  HEENT: Normocephalic, atruamatic, Moist mucous membranes  CV: Regular rate and rhythm, no JVD  Pulm: Normal inspiratory effort, no audible wheezing  Abdomen: Soft, nontender, nondistended abdomen without rebound or guarding  Extremities: No clubbing, cyanosis or edema  Psych: Normal affect. Good eye contact     Labs:   No results found for: CRP, CALPROTECTIN  No results found for: HEPBSAG, HEPBCAB  No results found for: TBGOLDPLUS  No results found for: BJZCWKID50BT, NPLMTOYN26  Lab Results   Component Value Date    HGB 12.5 01/21/2009    HCT 37.0 01/21/2009     No results found for: CREATININE, ALBUMIN, BILITOT, ALKPHOS, AST, ALT, GGT    Assessment/Plan:  Jesica Noe is a 21 y.o. female with a history of asthma who was referred for evaluation and treatment of GERD.    # Gastroesophageal reflux disease, unspecified whether esophagitis present [K21.9]    GERD for a few years, intermittent, without alarm symptoms, only with trigger foods.  We discussed avoiding triggers and beginning an as needed antacid, and considering endoscopy If symptoms persist/worsen    -- Pepcid as needed  -- Avoid reflux triggers  -- EGD if symptoms remain    RTC PRN    Thank you for involving us in the care of this patient.        Casper Styles MD  Department of Gastroenterology & Hepatology      "

## 2023-01-23 ENCOUNTER — OFFICE VISIT (OUTPATIENT)
Dept: PRIMARY CARE CLINIC | Facility: CLINIC | Age: 23
End: 2023-01-23
Payer: COMMERCIAL

## 2023-01-23 VITALS
RESPIRATION RATE: 20 BRPM | HEART RATE: 64 BPM | DIASTOLIC BLOOD PRESSURE: 75 MMHG | TEMPERATURE: 98 F | WEIGHT: 142.19 LBS | BODY MASS INDEX: 22.32 KG/M2 | HEIGHT: 67 IN | SYSTOLIC BLOOD PRESSURE: 118 MMHG

## 2023-01-23 DIAGNOSIS — N89.8 VAGINAL DISCHARGE: Primary | ICD-10-CM

## 2023-01-23 DIAGNOSIS — N93.9 ABNORMAL UTERINE BLEEDING: ICD-10-CM

## 2023-01-23 DIAGNOSIS — Z11.8 SCREENING FOR CHLAMYDIAL DISEASE: ICD-10-CM

## 2023-01-23 LAB — B-HCG UR QL: NEGATIVE

## 2023-01-23 PROCEDURE — 1159F MED LIST DOCD IN RCRD: CPT | Mod: CPTII,S$GLB,, | Performed by: STUDENT IN AN ORGANIZED HEALTH CARE EDUCATION/TRAINING PROGRAM

## 2023-01-23 PROCEDURE — 3074F SYST BP LT 130 MM HG: CPT | Mod: CPTII,S$GLB,, | Performed by: STUDENT IN AN ORGANIZED HEALTH CARE EDUCATION/TRAINING PROGRAM

## 2023-01-23 PROCEDURE — 99214 PR OFFICE/OUTPT VISIT, EST, LEVL IV, 30-39 MIN: ICD-10-PCS | Mod: S$GLB,,, | Performed by: STUDENT IN AN ORGANIZED HEALTH CARE EDUCATION/TRAINING PROGRAM

## 2023-01-23 PROCEDURE — 3008F BODY MASS INDEX DOCD: CPT | Mod: CPTII,S$GLB,, | Performed by: STUDENT IN AN ORGANIZED HEALTH CARE EDUCATION/TRAINING PROGRAM

## 2023-01-23 PROCEDURE — 1159F PR MEDICATION LIST DOCUMENTED IN MEDICAL RECORD: ICD-10-PCS | Mod: CPTII,S$GLB,, | Performed by: STUDENT IN AN ORGANIZED HEALTH CARE EDUCATION/TRAINING PROGRAM

## 2023-01-23 PROCEDURE — 81514 NFCT DS BV&VAGINITIS DNA ALG: CPT | Performed by: STUDENT IN AN ORGANIZED HEALTH CARE EDUCATION/TRAINING PROGRAM

## 2023-01-23 PROCEDURE — 99214 OFFICE O/P EST MOD 30 MIN: CPT | Mod: S$GLB,,, | Performed by: STUDENT IN AN ORGANIZED HEALTH CARE EDUCATION/TRAINING PROGRAM

## 2023-01-23 PROCEDURE — 3074F PR MOST RECENT SYSTOLIC BLOOD PRESSURE < 130 MM HG: ICD-10-PCS | Mod: CPTII,S$GLB,, | Performed by: STUDENT IN AN ORGANIZED HEALTH CARE EDUCATION/TRAINING PROGRAM

## 2023-01-23 PROCEDURE — 3008F PR BODY MASS INDEX (BMI) DOCUMENTED: ICD-10-PCS | Mod: CPTII,S$GLB,, | Performed by: STUDENT IN AN ORGANIZED HEALTH CARE EDUCATION/TRAINING PROGRAM

## 2023-01-23 PROCEDURE — 3078F DIAST BP <80 MM HG: CPT | Mod: CPTII,S$GLB,, | Performed by: STUDENT IN AN ORGANIZED HEALTH CARE EDUCATION/TRAINING PROGRAM

## 2023-01-23 PROCEDURE — 3078F PR MOST RECENT DIASTOLIC BLOOD PRESSURE < 80 MM HG: ICD-10-PCS | Mod: CPTII,S$GLB,, | Performed by: STUDENT IN AN ORGANIZED HEALTH CARE EDUCATION/TRAINING PROGRAM

## 2023-01-23 PROCEDURE — 99999 PR PBB SHADOW E&M-EST. PATIENT-LVL III: ICD-10-PCS | Mod: PBBFAC,,, | Performed by: STUDENT IN AN ORGANIZED HEALTH CARE EDUCATION/TRAINING PROGRAM

## 2023-01-23 PROCEDURE — 99999 PR PBB SHADOW E&M-EST. PATIENT-LVL III: CPT | Mod: PBBFAC,,, | Performed by: STUDENT IN AN ORGANIZED HEALTH CARE EDUCATION/TRAINING PROGRAM

## 2023-01-23 PROCEDURE — 81025 URINE PREGNANCY TEST: CPT | Performed by: STUDENT IN AN ORGANIZED HEALTH CARE EDUCATION/TRAINING PROGRAM

## 2023-01-23 NOTE — PROGRESS NOTES
01/23/2023    Jesica Noe  [id number]    CC: vagina discharge    HPI  This patient is new to me and presents for vaginal discharge    Period came on time at the end of last month and then restarted on the 11th but this time it was very heavy and with blood clots. No previous hx of clots.  After it stopped noticed weird discharge. Hx of BV and yeast infections. Tried boric acid but it did work. Describes as white to eggshell in color. Not malodorous but strong feminine odor. Discharge is still present today. Only chronic medication is probiotics. Denies any pain. Denies any urinary symptoms. Denies pregnancy, but states it's possible.      Negative 10 point ROS outside of HPI    Social History     Socioeconomic History    Marital status: Single   Tobacco Use    Smoking status: Never    Smokeless tobacco: Never   Substance and Sexual Activity    Alcohol use: No    Drug use: No    Sexual activity: Yes     Partners: Male     Birth control/protection: Injection           Current Outpatient Medications:     albuterol (PROAIR HFA) 90 mcg/actuation inhaler, Inhale 2 puffs into the lungs every 4 (four) hours as needed., Disp: 1 Inhaler, Rfl: 12    famotidine (PEPCID) 20 MG tablet, Take 1 tablet (20 mg total) by mouth 2 (two) times daily., Disp: 60 tablet, Rfl: 11    fluticasone propionate (FLONASE) 50 mcg/actuation nasal spray, 1 spray (50 mcg total) by Each Nare route once daily., Disp: 16 g, Rfl: 5    ibuprofen (ADVIL,MOTRIN) 400 MG tablet, TAKE 1 TABLET(400 MG) BY MOUTH EVERY 6 HOURS AS NEEDED, Disp: 385 tablet, Rfl: 2    triamcinolone acetonide 0.1% (KENALOG) 0.1 % cream, Apply topically 2 (two) times daily. for 10 days (Patient not taking: Reported on 8/18/2022), Disp: 15 g, Rfl: 1      Physical Exam  Vitals:    01/23/23 1343   BP: 118/75   Pulse: 64   Resp: 20   Temp: 97.9 °F (36.6 °C)       Gen: well appearing, NAD  Resp: non labored breathing, no crackles, no wheezes, CTAB  CV: RRR no murmur, gallops, rubs, no LE  edema  Abd: soft nontender BS present no organomegaly    1. Vaginal discharge  - VAGINOSIS SCREEN BY DNA PROBE  -routine chlamydia/gc screening    2. Abnormal uterine bleeding  Continue to monitor to see if recurs   - Pregnancy, urine rapid      RTC as needed     Katerin Brasher MD  Family Medicine

## 2023-01-26 ENCOUNTER — PATIENT MESSAGE (OUTPATIENT)
Dept: PRIMARY CARE CLINIC | Facility: CLINIC | Age: 23
End: 2023-01-26
Payer: COMMERCIAL

## 2023-01-26 DIAGNOSIS — N76.0 BACTERIAL VAGINOSIS: Primary | ICD-10-CM

## 2023-01-26 DIAGNOSIS — B96.89 BACTERIAL VAGINOSIS: Primary | ICD-10-CM

## 2023-01-26 RX ORDER — METRONIDAZOLE 500 MG/1
500 TABLET ORAL EVERY 12 HOURS
Qty: 14 TABLET | Refills: 0 | Status: SHIPPED | OUTPATIENT
Start: 2023-01-26 | End: 2023-02-02

## 2023-01-26 NOTE — TELEPHONE ENCOUNTER
Patient states she did a self swab on herself states because she has been bleeding like a discharge. Patient states it was two q-tips that went inside of the tube, states she gave it to Dr. Brasher Patient states she was told it was going to take 1-3 days to get results. Only results I am seeing in the symptoms is pregnancy, urine rapid. Patient states it wasn't like BV or yeast infection.  Please advise

## 2023-01-27 NOTE — TELEPHONE ENCOUNTER
Please let pt know the swab showed bacterial vaginosis. I have sent metronidazole to her pharmacy to take twice daily for 7 days.

## 2023-04-01 ENCOUNTER — HOSPITAL ENCOUNTER (EMERGENCY)
Facility: OTHER | Age: 23
Discharge: HOME | End: 2023-04-01
Attending: EMERGENCY MEDICINE
Payer: COMMERCIAL

## 2023-04-01 VITALS
OXYGEN SATURATION: 98 % | HEART RATE: 90 BPM | RESPIRATION RATE: 17 BRPM | DIASTOLIC BLOOD PRESSURE: 59 MMHG | SYSTOLIC BLOOD PRESSURE: 117 MMHG | WEIGHT: 145 LBS | HEIGHT: 67 IN | TEMPERATURE: 99 F | BODY MASS INDEX: 22.76 KG/M2

## 2023-04-01 DIAGNOSIS — A08.4 VIRAL GASTROENTERITIS: Primary | ICD-10-CM

## 2023-04-01 LAB
B-HCG UR QL: NEGATIVE
BILIRUB UR QL STRIP: NEGATIVE
CLARITY UR: CLEAR
COLOR UR: YELLOW
CTP QC/QA: YES
GLUCOSE UR QL STRIP: NEGATIVE
HGB UR QL STRIP: NEGATIVE
KETONES UR QL STRIP: NEGATIVE
LEUKOCYTE ESTERASE UR QL STRIP: NEGATIVE
NITRITE UR QL STRIP: NEGATIVE
PH UR STRIP: 6 [PH] (ref 5–8)
POC MOLECULAR INFLUENZA A AGN: NEGATIVE
POC MOLECULAR INFLUENZA B AGN: NEGATIVE
PROT UR QL STRIP: NEGATIVE
SARS-COV-2 RDRP RESP QL NAA+PROBE: NEGATIVE
SP GR UR STRIP: 1.02 (ref 1–1.03)
URN SPEC COLLECT METH UR: NORMAL
UROBILINOGEN UR STRIP-ACNC: NEGATIVE EU/DL

## 2023-04-01 PROCEDURE — 25000003 PHARM REV CODE 250: Performed by: NURSE PRACTITIONER

## 2023-04-01 PROCEDURE — 81025 URINE PREGNANCY TEST: CPT | Performed by: NURSE PRACTITIONER

## 2023-04-01 PROCEDURE — 81003 URINALYSIS AUTO W/O SCOPE: CPT

## 2023-04-01 PROCEDURE — 99283 EMERGENCY DEPT VISIT LOW MDM: CPT

## 2023-04-01 RX ORDER — ONDANSETRON 4 MG/1
4 TABLET, ORALLY DISINTEGRATING ORAL
Status: COMPLETED | OUTPATIENT
Start: 2023-04-01 | End: 2023-04-01

## 2023-04-01 RX ORDER — ONDANSETRON 4 MG/1
4 TABLET, ORALLY DISINTEGRATING ORAL 2 TIMES DAILY
Qty: 10 TABLET | Refills: 0 | Status: SHIPPED | OUTPATIENT
Start: 2023-04-01 | End: 2023-04-06

## 2023-04-01 RX ADMIN — ONDANSETRON 4 MG: 4 TABLET, ORALLY DISINTEGRATING ORAL at 02:04

## 2023-04-01 NOTE — FIRST PROVIDER EVALUATION
Emergency Department TeleTriage Encounter Note      CHIEF COMPLAINT    Chief Complaint   Patient presents with    Vomiting     C/o n/v, fever/chills that began yesterday. -red tint. Denies any other complaints. VSS        VITAL SIGNS   Initial Vitals [04/01/23 1321]   BP Pulse Resp Temp SpO2   (!) 117/59 90 17 98.7 °F (37.1 °C) 98 %      MAP       --            ALLERGIES    Review of patient's allergies indicates:  No Known Allergies    PROVIDER TRIAGE NOTE  This is a teletriage evaluation of a 22 y.o. female presenting to the ED complaining of n/v and body aches starting today. No diarrhea.  Pt reports she has vomited twice today. Also reports chills.     Well-appearing, vitals stable.     Initial orders will be placed and care will be transferred to an alternate provider when patient is roomed for a full evaluation. Any additional orders and the final disposition will be determined by that provider.         ORDERS  Labs Reviewed   POCT INFLUENZA A/B MOLECULAR   POCT URINE PREGNANCY   SARS-COV-2 RDRP GENE       ED Orders (720h ago, onward)      Start Ordered     Status Ordering Provider    04/01/23 1330 04/01/23 1326  ondansetron disintegrating tablet 4 mg  ED 1 Time         Ordered JOHN MAHER N.    04/01/23 1327 04/01/23 1326  POCT COVID-19 Rapid Screening  Once         Ordered JOHN MAHER.    04/01/23 1326 04/01/23 1326  POCT Influenza A/B Molecular  Once         Ordered JOHN MAHER    04/01/23 1326 04/01/23 1326  POCT urine pregnancy  Once         Ordered JOHN MAHER              Virtual Visit Note: The provider triage portion of this emergency department evaluation and documentation was performed via Secure-24, a HIPAA-compliant telemedicine application, in concert with a tele-presenter in the room. A face to face patient evaluation with one of my colleagues will occur once the patient is placed in an emergency department room.      DISCLAIMER: This note  was prepared with [employer] voice recognition transcription software. Garbled syntax, mangled pronouns, and other bizarre constructions may be attributed to that software system.

## 2023-04-01 NOTE — ED PROVIDER NOTES
Encounter Date: 4/1/2023       History     Chief Complaint   Patient presents with    Vomiting     C/o n/v, fever/chills that began yesterday. -red tint. Denies any other complaints. VSS      22-year-old female with history of asthma presents to the emergency department for evaluation of intermittent, sharp, lower abdominal pain that began this morning when she woke up.  She reports associated subjective fevers, chills, myalgias, weakness, nausea and multiple episodes of vomiting today.  She denies recent sick contacts.  She denies dizziness, headaches, congestion, rhinorrhea, sore throat, cough, chest pain, diarrhea, constipation, urinary symptoms, vaginal bleeding, or vaginal discharge.     The history is provided by the patient (friend at bedside).   Review of patient's allergies indicates:  No Known Allergies  Past Medical History:   Diagnosis Date    Allergy     Asthma      No past surgical history on file.  Family History   Problem Relation Age of Onset    Asthma Mother     Hypertension Mother     ADD / ADHD Father     Asthma Maternal Grandmother     Cancer Maternal Grandmother         uterus     Heart disease Maternal Grandmother     Cancer Maternal Grandfather         prostates     Cancer Paternal Grandmother         uterus     Colon cancer Paternal Grandfather     Esophageal cancer Neg Hx      Social History     Tobacco Use    Smoking status: Never    Smokeless tobacco: Never   Substance Use Topics    Alcohol use: No    Drug use: No     Review of Systems   Constitutional:  Positive for chills and fever (subjective).   HENT:  Negative for congestion, rhinorrhea and sore throat.    Respiratory:  Negative for cough and shortness of breath.    Cardiovascular:  Negative for chest pain.   Gastrointestinal:  Positive for abdominal pain (lower), nausea and vomiting. Negative for constipation and diarrhea.   Genitourinary:  Negative for dysuria, frequency and urgency.   Musculoskeletal:  Positive for myalgias.    Neurological:  Positive for weakness. Negative for dizziness and headaches.     Physical Exam     Initial Vitals [04/01/23 1321]   BP Pulse Resp Temp SpO2   (!) 117/59 90 17 98.7 °F (37.1 °C) 98 %      MAP       --         Physical Exam    Vitals reviewed.  Constitutional: She appears well-developed and well-nourished. No distress.   HENT:   Head: Normocephalic and atraumatic.   Right Ear: Tympanic membrane, external ear and ear canal normal.   Left Ear: Tympanic membrane, external ear and ear canal normal.   Mouth/Throat: Oropharynx is clear and moist.   Eyes: Conjunctivae are normal. Right eye exhibits no discharge. Left eye exhibits no discharge.   Neck: Neck supple.   Normal range of motion.  Cardiovascular:  Normal rate, regular rhythm, normal heart sounds and intact distal pulses.           Pulmonary/Chest: Breath sounds normal. No respiratory distress. She has no wheezes. She has no rhonchi. She has no rales.   Abdominal: Abdomen is soft. Bowel sounds are normal. She exhibits no distension and no mass. There is no abdominal tenderness.   No tenderness to palpation of the abdomen.  There is no rebound and no guarding.   Musculoskeletal:         General: Normal range of motion.      Cervical back: Normal range of motion and neck supple.     Neurological: She is alert and oriented to person, place, and time. She has normal strength.   Skin: Skin is warm and dry.   Psychiatric: She has a normal mood and affect. Her behavior is normal. Judgment and thought content normal.       ED Course   Procedures  Labs Reviewed   URINALYSIS, REFLEX TO URINE CULTURE    Narrative:     Specimen Source->Urine   POCT INFLUENZA A/B MOLECULAR   POCT URINE PREGNANCY   SARS-COV-2 RDRP GENE          Imaging Results    None          Medications   ondansetron disintegrating tablet 4 mg (4 mg Oral Given 4/1/23 3317)     Medical Decision Making:   Initial Assessment:   Emergent evaluation of 22-year-old female for subjective fevers,  chills, lower abdominal pain, nausea, and multiple episodes of vomiting that began today.  On exam she is no tenderness to palpation of the abdomen.  She is currently nauseous.  Vital signs stable.  She is afebrile.  Awaiting results of UPT, rapid COVID and rapid influenza test. Will give Zofran and reassess.   Clinical Tests:   Lab Tests: Ordered and Reviewed  ED Management:  On review of labs, UPT negative and rapid COVID and influenza tests are negative.  No signs of UTI on UA.     On reassessment, patient feels better after Zofran pain.  She is smiling and speaking to her friend. I updated the patient on all results. I explained to her that her symptoms are likely due to viral gastroenteritis. Will provide prescription for Zofran. I advised her to stay hydrated.  Recommended she follow up with her PCP. She verbalized agreement with this plan of care. She is stable for discharge. All questions answered.     I discussed this case with my supervising physician.                         Clinical Impression:   Final diagnoses:  [A08.4] Viral gastroenteritis (Primary)        ED Disposition Condition    Discharge Stable          ED Prescriptions       Medication Sig Dispense Start Date End Date Auth. Provider    ondansetron (ZOFRAN-ODT) 4 MG TbDL Take 1 tablet (4 mg total) by mouth 2 (two) times daily. for 5 days 10 tablet 4/1/2023 4/6/2023 Ronnie Purcell PA-C          Follow-up Information    None          Ronnie Purcell PA-C  04/03/23 0204       Ronnie Purcell PA-C  04/03/23 0205

## 2023-04-01 NOTE — ED TRIAGE NOTES
Pt presents to the ED w/ c/o vomiting, lower  abdominal pain, SOB, and generalized weakness upon awakening this AM. Pt also reporting generalized body aches.

## 2023-04-03 ENCOUNTER — TELEPHONE (OUTPATIENT)
Dept: FAMILY MEDICINE | Facility: CLINIC | Age: 23
End: 2023-04-03
Payer: COMMERCIAL

## 2023-04-03 NOTE — TELEPHONE ENCOUNTER
----- Message from Shira Neno sent at 4/3/2023  1:02 PM CDT -----  Name of Caller pt   Reason for Visit/Symptoms pt requesting to be seen asap for vomiting. Went to [facility] and they told her she had a stomach bug but still feels bad. Call   Best Contact Number [facility] [phone] ([facility])     Preferred Date/Time of Appointment  asap  Interested in Virtual Visit (yes/no)  Additional Information

## 2023-04-13 ENCOUNTER — OFFICE VISIT (OUTPATIENT)
Dept: GASTROENTEROLOGY | Facility: CLINIC | Age: 23
End: 2023-04-13
Payer: COMMERCIAL

## 2023-04-13 ENCOUNTER — TELEPHONE (OUTPATIENT)
Dept: GASTROENTEROLOGY | Facility: CLINIC | Age: 23
End: 2023-04-13
Payer: COMMERCIAL

## 2023-04-13 VITALS
DIASTOLIC BLOOD PRESSURE: 80 MMHG | HEART RATE: 67 BPM | SYSTOLIC BLOOD PRESSURE: 133 MMHG | WEIGHT: 145.5 LBS | HEIGHT: 67 IN | BODY MASS INDEX: 22.84 KG/M2

## 2023-04-13 DIAGNOSIS — K21.9 GASTROESOPHAGEAL REFLUX DISEASE, UNSPECIFIED WHETHER ESOPHAGITIS PRESENT: Primary | ICD-10-CM

## 2023-04-13 PROCEDURE — 99214 PR OFFICE/OUTPT VISIT, EST, LEVL IV, 30-39 MIN: ICD-10-PCS | Mod: S$GLB,,, | Performed by: INTERNAL MEDICINE

## 2023-04-13 PROCEDURE — 99999 PR PBB SHADOW E&M-EST. PATIENT-LVL III: CPT | Mod: PBBFAC,,, | Performed by: INTERNAL MEDICINE

## 2023-04-13 PROCEDURE — 3079F DIAST BP 80-89 MM HG: CPT | Mod: CPTII,S$GLB,, | Performed by: INTERNAL MEDICINE

## 2023-04-13 PROCEDURE — 3075F PR MOST RECENT SYSTOLIC BLOOD PRESS GE 130-139MM HG: ICD-10-PCS | Mod: CPTII,S$GLB,, | Performed by: INTERNAL MEDICINE

## 2023-04-13 PROCEDURE — 99999 PR PBB SHADOW E&M-EST. PATIENT-LVL III: ICD-10-PCS | Mod: PBBFAC,,, | Performed by: INTERNAL MEDICINE

## 2023-04-13 PROCEDURE — 3008F PR BODY MASS INDEX (BMI) DOCUMENTED: ICD-10-PCS | Mod: CPTII,S$GLB,, | Performed by: INTERNAL MEDICINE

## 2023-04-13 PROCEDURE — 99214 OFFICE O/P EST MOD 30 MIN: CPT | Mod: S$GLB,,, | Performed by: INTERNAL MEDICINE

## 2023-04-13 PROCEDURE — 3008F BODY MASS INDEX DOCD: CPT | Mod: CPTII,S$GLB,, | Performed by: INTERNAL MEDICINE

## 2023-04-13 PROCEDURE — 3075F SYST BP GE 130 - 139MM HG: CPT | Mod: CPTII,S$GLB,, | Performed by: INTERNAL MEDICINE

## 2023-04-13 PROCEDURE — 1159F PR MEDICATION LIST DOCUMENTED IN MEDICAL RECORD: ICD-10-PCS | Mod: CPTII,S$GLB,, | Performed by: INTERNAL MEDICINE

## 2023-04-13 PROCEDURE — 1159F MED LIST DOCD IN RCRD: CPT | Mod: CPTII,S$GLB,, | Performed by: INTERNAL MEDICINE

## 2023-04-13 PROCEDURE — 3079F PR MOST RECENT DIASTOLIC BLOOD PRESSURE 80-89 MM HG: ICD-10-PCS | Mod: CPTII,S$GLB,, | Performed by: INTERNAL MEDICINE

## 2023-04-13 RX ORDER — FAMOTIDINE 40 MG/1
20 TABLET, FILM COATED ORAL 2 TIMES DAILY
Qty: 30 TABLET | Refills: 11 | Status: SHIPPED | OUTPATIENT
Start: 2023-04-13 | End: 2024-04-12

## 2023-04-13 NOTE — PROGRESS NOTES
[facility]   Clinic Note              Patient Name: Jesica Noe  Age: 22 y.o.  Sex: female  MRN: [id number]    TODAY'S DATE:  4/13/2023  Last visit: 8/18/2022      REFERRING PROVIDER:  No ref. provider found     Diagnosis: GERD    HPI:   Jesica Noe is a 22 y.o. female with a history of asthma who was referred for evaluation and treatment of GERD.    Patient notes that majority of her family suffers from acid reflux.  She mentioned reflux to her father, who noted that given symptoms were keeping her up at night she should be seen by a doctor.  She notes reflux for 3-4 years.  Some nausea, no emesis.  Some nocturnal waking 2-3 times month.  Not on medications, has tried tums in the past.  Her diet is unstructured, not too much fried foods but fair amount of takeout.  Tacos last night made her reflux worse, as did multiple margaritas.    Interim history:  Today, patient notes persistent bloating, heartburn, and epigastric pain since last clinc appointment.   She did not  prescription for pepcid.      PRIOR ENDOSCOPY:  -Colonoscopy: --  -EGD - -      ROS: Negative other than above      Review of patient's allergies indicates:  No Known Allergies    Outpatient Medications Marked as Taking for the 4/13/23 encounter (Office Visit) with Casper Styles MD   Medication Sig Dispense Refill    fluticasone propionate (FLONASE) 50 mcg/actuation nasal spray 1 spray (50 mcg total) by Each Nare route once daily. 16 g 5    ibuprofen (ADVIL,MOTRIN) 400 MG tablet TAKE 1 TABLET(400 MG) BY MOUTH EVERY 6 HOURS AS NEEDED 385 tablet 2       Past Medical History:   Diagnosis Date    Allergy     Asthma        No past surgical history on file.    Family History   Problem Relation Age of Onset    Asthma Mother     Hypertension Mother     ADD / ADHD Father     Asthma Maternal Grandmother     Cancer Maternal Grandmother         uterus     Heart disease Maternal Grandmother     Cancer Maternal Grandfather          "prostates     Cancer Paternal Grandmother         uterus     Colon cancer Paternal Grandfather     Esophageal cancer Neg Hx        Social History     Socioeconomic History    Marital status: Single   Tobacco Use    Smoking status: Never    Smokeless tobacco: Never   Substance and Sexual Activity    Alcohol use: No    Drug use: No    Sexual activity: Yes     Partners: Male     Birth control/protection: Injection         Vital Signs:  /80   Pulse 67   Ht 5' 7" (1.702 m)   Wt 66 kg (145 lb 8.1 oz)   BMI 22.79 kg/m²      General: Awoke and orientedx3, in no acute distress  HEENT: Normocephalic, atruamatic, Moist mucous membranes  CV: Regular rate and rhythm, no JVD  Pulm: Normal inspiratory effort, no audible wheezing  Abdomen: Soft, nontender, nondistended abdomen without rebound or guarding  Extremities: No clubbing, cyanosis or edema  Psych: Normal affect. Good eye contact     Labs:   No results found for: CRP, CALPROTECTIN  No results found for: HEPBSAG, HEPBCAB  No results found for: TBGOLDPLUS  No results found for: [id number], RRNTPSMM40  Lab Results   Component Value Date    HGB 12.5 01/21/2009    HCT 37.0 01/21/2009     No results found for: CREATININE, ALBUMIN, BILITOT, ALKPHOS, AST, ALT, GGT    Assessment/Plan:  Jesica Noe is a 22 y.o. female with a history of asthma who was referred for evaluation and treatment of GERD.    # No primary diagnosis found.    GERD for a few years, intermittent, initially without alarm symptoms so it initial clinic appointment I recommended Pepcid as needed.  Her symptoms have increased in frequency to occurring nearly daily.  She did not actually begin Pepcid, which I again recommended today and send a prescription to her pharmacy, and advised we can move forward with EGD.    -- Pepcid as needed  -- Avoid reflux triggers  -- EGD     RTC PRN    Thank you for involving us in the care of this patient.        Casper Styles MD  Department of Gastroenterology & " Hepatology

## 2023-04-14 ENCOUNTER — TELEPHONE (OUTPATIENT)
Dept: ENDOSCOPY | Facility: HOSPITAL | Age: 23
End: 2023-04-14
Payer: COMMERCIAL

## 2023-04-14 ENCOUNTER — PATIENT MESSAGE (OUTPATIENT)
Dept: ENDOSCOPY | Facility: HOSPITAL | Age: 23
End: 2023-04-14
Payer: COMMERCIAL

## 2023-04-14 DIAGNOSIS — K21.9 GASTROESOPHAGEAL REFLUX DISEASE, UNSPECIFIED WHETHER ESOPHAGITIS PRESENT: Primary | ICD-10-CM

## 2023-04-14 NOTE — TELEPHONE ENCOUNTER
"   Message  Received: Yesterday  Casper Styles MD  [facility]  Caller: Unspecified (Yesterday,  3:46 PM)  Procedure: EGD     Diagnosis: GERD     Procedure Timin-4 weeks     *If within 4 weeks selected, please patrizia as high priority*     *If greater than 12 weeks, please select "5-12 weeks" and delay sending until 2 months prior to requested date*     Provider: Any GI provider     Location: No Preference     Additional Scheduling Information: No scheduling concerns     Prep Specifications:N/A     Have you attached a patient to this message: yes    "

## 2023-04-17 ENCOUNTER — TELEPHONE (OUTPATIENT)
Dept: ENDOSCOPY | Facility: HOSPITAL | Age: 23
End: 2023-04-17
Payer: COMMERCIAL

## 2023-04-17 NOTE — TELEPHONE ENCOUNTER
Called pt to confirm arrival time this am. Pt needs to cancel and would like to reschedule on the same day her mom is having a procedure for easier discharge/transportation reasons. Pt would also like to add colonoscopy to her procedure. Dr collazo notified.

## 2023-06-13 ENCOUNTER — PATIENT MESSAGE (OUTPATIENT)
Dept: RESEARCH | Facility: HOSPITAL | Age: 23
End: 2023-06-13
Payer: COMMERCIAL

## 2023-07-05 ENCOUNTER — PATIENT MESSAGE (OUTPATIENT)
Dept: RESEARCH | Facility: HOSPITAL | Age: 23
End: 2023-07-05
Payer: COMMERCIAL

## 2023-08-09 ENCOUNTER — OCCUPATIONAL HEALTH (OUTPATIENT)
Dept: URGENT CARE | Facility: CLINIC | Age: 23
End: 2023-08-09
Payer: COMMERCIAL

## 2023-08-09 DIAGNOSIS — Z02.1 PRE-EMPLOYMENT EXAMINATION: Primary | ICD-10-CM

## 2023-08-09 LAB
CTP QC/QA: YES
FECAL OCCULT BLOOD, POC: NEGATIVE

## 2023-08-09 PROCEDURE — 82977 GAMMA GT: ICD-10-PCS | Mod: QW,S$GLB,, | Performed by: NURSE PRACTITIONER

## 2023-08-09 PROCEDURE — 80053 COMPREHENSIVE METABOLIC PANEL: ICD-10-PCS | Mod: QW,S$GLB,, | Performed by: NURSE PRACTITIONER

## 2023-08-09 PROCEDURE — 80061 LIPID PANEL: CPT | Mod: QW,S$GLB,, | Performed by: NURSE PRACTITIONER

## 2023-08-09 PROCEDURE — 87389 HIV-1 AG W/HIV-1&-2 AB AG IA: CPT | Mod: QW,S$GLB,, | Performed by: NURSE PRACTITIONER

## 2023-08-09 PROCEDURE — 80305 OOH NON-DOT DRUG SCREEN: ICD-10-PCS | Mod: S$GLB,,, | Performed by: NURSE PRACTITIONER

## 2023-08-09 PROCEDURE — 86580 POCT TB SKIN TEST: ICD-10-PCS | Mod: S$GLB,,, | Performed by: STUDENT IN AN ORGANIZED HEALTH CARE EDUCATION/TRAINING PROGRAM

## 2023-08-09 PROCEDURE — 92552 PURE TONE AUDIOMETRY AIR: CPT | Mod: S$GLB,,, | Performed by: NURSE PRACTITIONER

## 2023-08-09 PROCEDURE — 99499 PHYSICAL, BASIC COMPLEXITY: ICD-10-PCS | Mod: S$GLB,,, | Performed by: NURSE PRACTITIONER

## 2023-08-09 PROCEDURE — 80061 LIPID PANEL: ICD-10-PCS | Mod: QW,S$GLB,, | Performed by: NURSE PRACTITIONER

## 2023-08-09 PROCEDURE — 82270 OCCULT BLOOD FECES: CPT | Mod: S$GLB,,, | Performed by: NURSE PRACTITIONER

## 2023-08-09 PROCEDURE — 80305 DRUG TEST PRSMV DIR OPT OBS: CPT | Mod: S$GLB,,, | Performed by: NURSE PRACTITIONER

## 2023-08-09 PROCEDURE — 80053 COMPREHEN METABOLIC PANEL: CPT | Mod: QW,S$GLB,, | Performed by: NURSE PRACTITIONER

## 2023-08-09 PROCEDURE — 92552 AUDIOGRAM OCC MED: ICD-10-PCS | Mod: S$GLB,,, | Performed by: NURSE PRACTITIONER

## 2023-08-09 PROCEDURE — 87389 HIV 1 / 2 ANTIBODY: ICD-10-PCS | Mod: QW,S$GLB,, | Performed by: NURSE PRACTITIONER

## 2023-08-09 PROCEDURE — 82270 POCT OCCULT BLOOD STOOL: ICD-10-PCS | Mod: S$GLB,,, | Performed by: NURSE PRACTITIONER

## 2023-08-09 PROCEDURE — 97750 STRENGTH & FLEX: ICD-10-PCS | Mod: S$GLB,,, | Performed by: NURSE PRACTITIONER

## 2023-08-09 PROCEDURE — 97750 PHYSICAL PERFORMANCE TEST: CPT | Mod: S$GLB,,, | Performed by: NURSE PRACTITIONER

## 2023-08-09 PROCEDURE — 86592 SYPHILIS TEST NON-TREP QUAL: CPT | Mod: S$GLB,,, | Performed by: NURSE PRACTITIONER

## 2023-08-09 PROCEDURE — 86592 RPR: ICD-10-PCS | Mod: S$GLB,,, | Performed by: NURSE PRACTITIONER

## 2023-08-09 PROCEDURE — 82977 ASSAY OF GGT: CPT | Mod: QW,S$GLB,, | Performed by: NURSE PRACTITIONER

## 2023-08-09 PROCEDURE — 86580 TB INTRADERMAL TEST: CPT | Mod: S$GLB,,, | Performed by: STUDENT IN AN ORGANIZED HEALTH CARE EDUCATION/TRAINING PROGRAM

## 2023-08-09 PROCEDURE — 99499 UNLISTED E&M SERVICE: CPT | Mod: S$GLB,,, | Performed by: NURSE PRACTITIONER

## 2023-08-11 ENCOUNTER — TELEPHONE (OUTPATIENT)
Dept: URGENT CARE | Facility: CLINIC | Age: 23
End: 2023-08-11
Payer: COMMERCIAL

## 2023-08-11 NOTE — TELEPHONE ENCOUNTER
Attempted to call pt with lab results. No answer. VM message left requesting he return call. SUZI

## 2023-08-12 LAB
TB INDURATION - 48 HR READ: 0 MM
TB INDURATION - 72 HR READ: 0 MM
TB SKIN TEST - 48 HR READ: NEGATIVE
TB SKIN TEST - 72 HR READ: NEGATIVE

## 2023-11-30 ENCOUNTER — OFFICE VISIT (OUTPATIENT)
Dept: URGENT CARE | Facility: CLINIC | Age: 23
End: 2023-11-30
Payer: COMMERCIAL

## 2023-11-30 ENCOUNTER — OCCUPATIONAL HEALTH (OUTPATIENT)
Dept: URGENT CARE | Facility: CLINIC | Age: 23
End: 2023-11-30
Payer: COMMERCIAL

## 2023-11-30 VITALS
SYSTOLIC BLOOD PRESSURE: 113 MMHG | RESPIRATION RATE: 18 BRPM | DIASTOLIC BLOOD PRESSURE: 74 MMHG | BODY MASS INDEX: 22.79 KG/M2 | HEART RATE: 60 BPM | HEIGHT: 67 IN | OXYGEN SATURATION: 96 %

## 2023-11-30 DIAGNOSIS — Z02.6 ENCOUNTER RELATED TO WORKER'S COMPENSATION CLAIM: ICD-10-CM

## 2023-11-30 DIAGNOSIS — S61.211A LACERATION OF INDEX FINGER OF LEFT HAND WITHOUT COMPLICATION, INITIAL ENCOUNTER: Primary | ICD-10-CM

## 2023-11-30 DIAGNOSIS — Z13.9 ENCOUNTER FOR SCREENING: Primary | ICD-10-CM

## 2023-11-30 LAB — BREATH ALCOHOL: 0

## 2023-11-30 PROCEDURE — 80305 PR NON-DOT DRUG SCREENS: ICD-10-PCS | Mod: ,,, | Performed by: EMERGENCY MEDICINE

## 2023-11-30 PROCEDURE — 99203 OFFICE O/P NEW LOW 30 MIN: CPT | Mod: S$GLB,,, | Performed by: EMERGENCY MEDICINE

## 2023-11-30 PROCEDURE — 82075 ASSAY OF BREATH ETHANOL: CPT | Mod: S$GLB,,, | Performed by: EMERGENCY MEDICINE

## 2023-11-30 PROCEDURE — 99203 PR OFFICE/OUTPT VISIT, NEW, LEVL III, 30-44 MIN: ICD-10-PCS | Mod: S$GLB,,, | Performed by: EMERGENCY MEDICINE

## 2023-11-30 PROCEDURE — 82075 POCT BREATH ALCOHOL TEST: ICD-10-PCS | Mod: S$GLB,,, | Performed by: EMERGENCY MEDICINE

## 2023-11-30 PROCEDURE — 80305 DRUG TEST PRSMV DIR OPT OBS: CPT | Mod: ,,, | Performed by: EMERGENCY MEDICINE

## 2023-11-30 NOTE — PROGRESS NOTES
Subjective:      Patient ID: Jesica Noe is a 22 y.o. female.    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   What they did immediately after - Cleaned the area.   Pain scale right now - 2  LM    IS A 22-YEAR-OLD RIGHT-HANDED FEMALE WHO WORKS AT THE [employer] AND WAS UNPACKING RAZORS HAS A VERY SUPERFICIAL LACERATION/ABRASION TO THE LEFT INDEX FINGER AT THE AREA OF THE PROXIMAL INTERPHALANGEAL JOINT SUPERFICIAL SKIN RADIAL ASPECT OF THE FINGER.  IT WAS CLEANED THOROUGHLY AND OINTMENT PLACED AND AND INCIDENT REPORT WAS MADE AND SHE HAD TO COME HERE FOR EVALUATION.  THE WOUND IS 0.75 CM LONG AND ALREADY CLOSED SPONTANEOUSLY.  PLACED BAND-AID AND UPDATED HER TETANUS SHOT IN CLINIC.  SHE WILL BE ALLOWED TO WORK REGULAR DUTY WITHOUT RESTRICTIONS KEEPING BAND-AID ON FOR TODAY AND TOMORROW.  RELEASED TO WORK AND DISCHARGE FROM OCCUPATIONAL HEALTH.    Hand Injury   Her dominant hand is their left hand. The incident occurred 3 to 6 hours ago. The incident occurred at work. The quality of the pain is described as aching. The pain does not radiate. The pain is at a severity of 2/10. The pain has been Fluctuating since the incident. Pertinent negatives include no chest pain, muscle weakness, numbness or tingling. The symptoms are aggravated by movement. She has tried nothing for the symptoms.       Constitution: Negative for chills, fatigue and fever.   HENT:  Negative for ear pain, sinus pain and sore throat.    Neck: Negative for neck pain and neck stiffness.   Cardiovascular:  Negative for chest pain, palpitations and sob on exertion.   Eyes:  Negative for eye pain  and vision loss.   Respiratory:  Negative for cough, shortness of breath and asthma.    Gastrointestinal:  Negative for abdominal pain, nausea, vomiting and diarrhea.   Genitourinary:  Negative for dysuria, frequency and hematuria.   Musculoskeletal:  Negative for pain, abnormal ROM of joint and back pain.   Skin:  Positive for abrasion and laceration. Negative for rash, wound and erythema.   Allergic/Immunologic: Negative for seasonal allergies and asthma.   Neurological:  Negative for dizziness, light-headedness, altered mental status and numbness.   Psychiatric/Behavioral:  Negative for altered mental status and confusion.      Objective:     Physical Exam  Vitals and nursing note reviewed.   Constitutional:       Appearance: She is well-developed.   HENT:      Head: Normocephalic and atraumatic. No abrasion, contusion or laceration.      Right Ear: External ear normal.      Left Ear: External ear normal.      Nose: Nose normal.   Eyes:      General: Lids are normal.      Conjunctiva/sclera: Conjunctivae normal.      Pupils: Pupils are equal, round, and reactive to light.   Neck:      Trachea: Trachea and phonation normal.   Cardiovascular:      Rate and Rhythm: Normal rate and regular rhythm.      Heart sounds: Normal heart sounds.   Pulmonary:      Effort: Pulmonary effort is normal. No respiratory distress.      Breath sounds: Normal breath sounds. No stridor.   Musculoskeletal:         General: Signs of injury present. No swelling or tenderness. Normal range of motion.      Cervical back: Full passive range of motion without pain and neck supple.      Comments: NORMAL RANGE OF MOTION STRENGTH NO BRUISING NO SWELLING NO BONY PAIN VERY SUPERFICIAL LACERATION/ABRASION TO THE LEFT INDEX FINGER   Skin:     General: Skin is warm and dry.      Capillary Refill: Capillary refill takes less than 2 seconds.      Findings: No abrasion, bruising, burn, ecchymosis, erythema, laceration, lesion or rash.      Comments:  EXAMINATION OF THE LEFT HAND SHOWS A 0.75 CM LACERATION/ABRASION TO THE RADIAL ASPECT OF THE LEFT INDEX FINGER LOCATED AT THE SOFT TISSUE/SKIN OF THE PIP REGION.  NO BONY INVOLVEMENT NORMAL TENDON FUNCTION VERY SUPERFICIAL AND ALREADY CLOSED SPONTANEOUSLY.   Neurological:      Mental Status: She is alert and oriented to person, place, and time.   Psychiatric:         Speech: Speech normal.         Behavior: Behavior normal.         Thought Content: Thought content normal.         Judgment: Judgment normal.        Assessment:      1. Laceration of index finger of left hand without complication, initial encounter    2. 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  IT WAS CLEANED THOROUGHLY AND OINTMENT PLACED AND AND INCIDENT REPORT WAS MADE AND SHE HAD TO COME HERE FOR EVALUATION.  THE WOUND IS 0.75 CM LONG AND ALREADY CLOSED SPONTANEOUSLY.  PLACED BAND-AID AND UPDATED HER TETANUS SHOT IN CLINIC.  SHE WILL BE ALLOWED TO WORK REGULAR DUTY WITHOUT RESTRICTIONS KEEPING BAND-AID ON FOR TODAY AND TOMORROW.  RELEASED TO WORK AND DISCHARGE FROM OCCUPATIONAL HEALTH.  ADDENDUM.  PATIENT ORDER FOR TETANUS SHOT CANCELED AS SHE REPORTS THAT SHE HAD TETANUS SHOT UPDATED IN 2021 AFTER GETTING STITCHES.     Patient Instructions: Keep dressing clean/dry/covered, Attention not to aggravate affected area   Restrictions: Regular Duty, Discharged from Occupational Health  Follow up if symptoms worsen or fail to improve.

## 2023-11-30 NOTE — LETTER
[facility] - Urgent Care  [address] [facility], [address]  [place] [address]  Phone: [phone]  Fax: [phone]  [facility]: [email or web address]    Pt Name: Jesica Noe  Injury Date: 11/30/2023   Employee ID:  Date of First Treatment: 11/30/2023   Company: [employer]      Appointment Time: 09:24 AM Arrived: 09:24 AM   Provider: Jonathan Hsu MD Time Out:10:30 AM     Office Treatment:   1. Laceration of index finger of left hand without complication, initial encounter    2. Encounter related to worker's compensation claim          Patient Instructions: Keep dressing clean/dry/covered, Attention not to aggravate affected area    Restrictions: Regular Duty, Discharged from Occupational Health     Return Appointment: if symptoms worsen or fail to improve

## 2023-12-12 ENCOUNTER — HOSPITAL ENCOUNTER (EMERGENCY)
Facility: HOSPITAL | Age: 23
Discharge: HOME | End: 2023-12-12
Attending: EMERGENCY MEDICINE
Payer: COMMERCIAL

## 2023-12-12 VITALS
SYSTOLIC BLOOD PRESSURE: 111 MMHG | HEIGHT: 67 IN | OXYGEN SATURATION: 99 % | BODY MASS INDEX: 21.97 KG/M2 | HEART RATE: 70 BPM | TEMPERATURE: 98 F | DIASTOLIC BLOOD PRESSURE: 68 MMHG | WEIGHT: 140 LBS | RESPIRATION RATE: 18 BRPM

## 2023-12-12 DIAGNOSIS — B34.9 VIRAL SYNDROME: ICD-10-CM

## 2023-12-12 DIAGNOSIS — Z32.01 POSITIVE PREGNANCY TEST: Primary | ICD-10-CM

## 2023-12-12 LAB
B-HCG UR QL: POSITIVE
CTP QC/QA: YES

## 2023-12-12 PROCEDURE — 99284 EMERGENCY DEPT VISIT MOD MDM: CPT

## 2023-12-12 PROCEDURE — 81025 URINE PREGNANCY TEST: CPT

## 2023-12-12 RX ORDER — GUAIFENESIN 100 MG/5ML
200 SOLUTION ORAL EVERY 4 HOURS PRN
Qty: 118 ML | Refills: 0 | Status: SHIPPED | OUTPATIENT
Start: 2023-12-12 | End: 2023-12-22

## 2023-12-12 RX ORDER — ACETAMINOPHEN 500 MG
500 TABLET ORAL EVERY 4 HOURS PRN
Qty: 30 TABLET | Refills: 0 | Status: SHIPPED | OUTPATIENT
Start: 2023-12-12

## 2023-12-12 RX ORDER — PHENOL 1.4 %
AEROSOL, SPRAY (ML) MUCOUS MEMBRANE
Qty: 177 ML | Refills: 0 | Status: SHIPPED | OUTPATIENT
Start: 2023-12-12

## 2023-12-12 NOTE — ED PROVIDER NOTES
Encounter Date: 12/12/2023       History     Chief Complaint   Patient presents with    Cough     Cough, runny nose x 1.5 weeks.      22-year-old female with no past medical history presents to ED for emergent evaluation of 2 week history of nonproductive cough, voice loss, rhinorrhea, nasal congestion, nausea.  She also reports sore throat only with coughing.  She denies any nausea at this time.  She denies any emesis today.  Her last menstrual period was 11/05/2023.  She states that she has irregular menses.  Her OBGYN is Dr. Eugene.  She denies any fever, chills, chest pain, shortness of breath, hemoptysis, abdominal pain, vaginal bleeding, vaginal discharge, dysphagia, emesis, diarrhea.  No attempted treatment reported.  No other symptoms reported.    The history is provided by the patient. No  was used.     Review of patient's allergies indicates:  No Known Allergies  Past Medical History:   Diagnosis Date    Allergy     Asthma      No past surgical history on file.  Family History   Problem Relation Age of Onset    Asthma Mother     Hypertension Mother     ADD / ADHD Father     Asthma Maternal Grandmother     Cancer Maternal Grandmother         uterus     Heart disease Maternal Grandmother     Cancer Maternal Grandfather         prostates     Cancer Paternal Grandmother         uterus     Colon cancer Paternal Grandfather     Esophageal cancer Neg Hx      Social History     Tobacco Use    Smoking status: Never    Smokeless tobacco: Never   Substance Use Topics    Alcohol use: No    Drug use: No     Review of Systems   Constitutional:  Negative for chills and fever.   HENT:  Positive for congestion, rhinorrhea and sore throat. Negative for ear pain and trouble swallowing.    Eyes:  Negative for redness.   Respiratory:  Positive for cough. Negative for shortness of breath.         (-) hemoptysis   Cardiovascular:  Negative for chest pain.   Gastrointestinal:  Negative for abdominal pain,  diarrhea, nausea and vomiting.   Genitourinary:  Negative for decreased urine volume, difficulty urinating, dysuria, frequency, hematuria, urgency, vaginal bleeding, vaginal discharge and vaginal pain.   Musculoskeletal:  Negative for back pain and neck pain.   Skin:  Negative for rash.   Neurological:  Negative for headaches.   Psychiatric/Behavioral:  Negative for confusion.        Physical Exam     Initial Vitals [12/12/23 1011]   BP Pulse Resp Temp SpO2   114/68 71 18 97.4 °F (36.3 °C) 98 %      MAP       --         Physical Exam    Nursing note and vitals reviewed.  Constitutional: She appears well-developed and well-nourished.  Non-toxic appearance. She does not appear ill.   HENT:   Head: Normocephalic and atraumatic.   Right Ear: Hearing, tympanic membrane, external ear and ear canal normal. Tympanic membrane is not perforated, not erythematous and not bulging.   Left Ear: Hearing, tympanic membrane, external ear and ear canal normal. Tympanic membrane is not perforated, not erythematous and not bulging.   Nose: Nose normal.   Mouth/Throat: Uvula is midline, oropharynx is clear and moist and mucous membranes are normal.   Eyes: Conjunctivae and EOM are normal.   Neck: Neck supple.   Normal range of motion.   Full passive range of motion without pain.     Cardiovascular:  Normal rate and regular rhythm.           Pulses:       Radial pulses are 2+ on the right side and 2+ on the left side.   Pulmonary/Chest: Effort normal and breath sounds normal. No accessory muscle usage. No respiratory distress. She has no decreased breath sounds.   Abdominal: Abdomen is soft. Bowel sounds are normal. She exhibits no distension. There is no abdominal tenderness. There is no rebound and no guarding.   Musculoskeletal:         General: Normal range of motion.      Cervical back: Full passive range of motion without pain, normal range of motion and neck supple. No rigidity.     Neurological: She is alert. No cranial nerve  deficit.   Neuro intact.  Strength and sensation intact bilateral upper and lower extremities.   Skin: Skin is warm and dry.   Psychiatric: She has a normal mood and affect.         ED Course   Procedures  Labs Reviewed   POCT URINE PREGNANCY - Abnormal; Notable for the following components:       Result Value    POC Preg Test, Ur Positive (*)     All other components within normal limits          Imaging Results    None          Medications - No data to display  Medical Decision Making  This is a 22-year-old female with no past medical history presents to ED for emergent evaluation of 2 week history of nonproductive cough, voice loss, rhinorrhea, nasal congestion, nausea.  She denies any nausea at this time.  She denies any emesis today. On physical exam, patient is well-appearing and in no acute distress.  Nontoxic appearing.  Lungs are clear to auscultation bilaterally.  Abdomen is soft and nontender.  No guarding, rigidity, rebound.  2+ radial pulses bilaterally.  Posterior oropharynx is not erythematous.  No edema or exudate.  Uvula midline.  Bilateral tympanic membrane is normal.  No erythema, bulging, or perforations.  Neuro intact.  Strength and sensation intact bilateral upper and lower extremities.  Full range of motion of neck.  No neck rigidity.  UPT positive.  Patient is A0.  Patient is approximately 5 weeks and 2 days gravid based on LMP.  Will discharge patient on Tylenol, Robitussin, Chloraseptic throat spray, Cepacol lozenges.  Advised patient to drink lot of fluids upon discharge.  Urged prompt follow-up with OBGYN and PCP for further evaluation.    Strict return precautions given. I discussed with the patient/family the diagnosis, treatment plan, indications for return to the emergency department, and for expected follow-up. The patient/family verbalized an understanding. The patient/family is asked if there are any questions or concerns. We discuss the case, until all issues are addressed to  the patient/family's satisfaction. Patient/family understands and is agreeable to the plan. Patient is stable and ready for discharge.      Amount and/or Complexity of Data Reviewed  Labs: ordered.                                      Clinical Impression:  Final diagnoses:  [Z32.01] Positive pregnancy test (Primary)  [B34.9] Viral syndrome          ED Disposition Condition    Discharge Stable          ED Prescriptions       Medication Sig Dispense Start Date End Date Auth. Provider    acetaminophen (TYLENOL) 500 MG tablet Take 1 tablet (500 mg total) by mouth every 4 (four) hours as needed for Temperature greater than (100.5 or greater). 30 tablet 12/12/2023 -- Toribio Perez PA-C    phenoL (CHLORASEPTIC THROAT SPRAY) 1.4 % SprA by Mucous Membrane route every 2 (two) hours. 177 mL 12/12/2023 -- Toribio Perez PA-C    guaiFENesin 100 mg/5 ml (ROBITUSSIN) 100 mg/5 mL syrup Take 10 mLs (200 mg total) by mouth every 4 (four) hours as needed for Cough. 118 mL 12/12/2023 12/22/2023 Toribio Perez PA-C    benzocaine-menthoL 15-3.6 mg Lozg 1 lozenge by Mucous Membrane route every 2 (two) hours as needed (sore throat). 16 lozenge 12/12/2023 -- Toribio Perez PA-C          Follow-up Information       Follow up With Specialties Details Why Contact Info    Jayla Minor MD Obstetrics and Gynecology In 2 days for further evaluation [address]  [address]  [facility] [address]  [phone]      Cristina Baca MD Family Medicine In 2 days for further evaluation [address] [facility] [address] [phone]      [facility] - Emergency Dept Emergency Medicine In 2 days If symptoms worsen [address]  [facility] [place] [address] [phone]             Toribio Perez PA-C  12/12/23 1717

## 2023-12-12 NOTE — Clinical Note
"Jesica Grimesoanh Noe was seen and treated in our emergency department on 12/12/2023.  She may return to work on 12/14/2023.       If you have any questions or concerns, please don't hesitate to call.      Toribio Perez PA-C"

## 2023-12-12 NOTE — DISCHARGE INSTRUCTIONS
Please return to the Emergency Department for any new or worsening symptoms including: vaginal bleeding, abdominal pain, fever, chest pain, shortness of breath, loss of consciousness, dizziness, weakness, or any other concerns.     Please follow up with your Primary Care Provider within in the week. If you do not have one, you may contact the one listed on your discharge paperwork or you may also call the [facility] Appointment Desk at [phone] to schedule an appointment with one.     Please take all medication as prescribed.

## 2023-12-19 ENCOUNTER — OFFICE VISIT (OUTPATIENT)
Dept: OBSTETRICS AND GYNECOLOGY | Facility: CLINIC | Age: 23
End: 2023-12-19
Payer: COMMERCIAL

## 2023-12-19 VITALS
WEIGHT: 148.56 LBS | BODY MASS INDEX: 23.27 KG/M2 | DIASTOLIC BLOOD PRESSURE: 70 MMHG | SYSTOLIC BLOOD PRESSURE: 100 MMHG

## 2023-12-19 DIAGNOSIS — Z32.01 PREGNANCY CONFIRMED BY POSITIVE URINE TEST: Primary | ICD-10-CM

## 2023-12-19 PROCEDURE — 87591 N.GONORRHOEAE DNA AMP PROB: CPT | Performed by: OBSTETRICS & GYNECOLOGY

## 2023-12-19 PROCEDURE — 81514 NFCT DS BV&VAGINITIS DNA ALG: CPT | Performed by: OBSTETRICS & GYNECOLOGY

## 2023-12-19 PROCEDURE — 99999 PR PBB SHADOW E&M-EST. PATIENT-LVL III: CPT | Mod: PBBFAC,,, | Performed by: OBSTETRICS & GYNECOLOGY

## 2023-12-19 PROCEDURE — 1159F MED LIST DOCD IN RCRD: CPT | Mod: CPTII,S$GLB,, | Performed by: OBSTETRICS & GYNECOLOGY

## 2023-12-19 PROCEDURE — 3008F BODY MASS INDEX DOCD: CPT | Mod: CPTII,S$GLB,, | Performed by: OBSTETRICS & GYNECOLOGY

## 2023-12-19 PROCEDURE — 3074F SYST BP LT 130 MM HG: CPT | Mod: CPTII,S$GLB,, | Performed by: OBSTETRICS & GYNECOLOGY

## 2023-12-19 PROCEDURE — 99214 OFFICE O/P EST MOD 30 MIN: CPT | Mod: S$GLB,,, | Performed by: OBSTETRICS & GYNECOLOGY

## 2023-12-19 PROCEDURE — 3074F PR MOST RECENT SYSTOLIC BLOOD PRESSURE < 130 MM HG: ICD-10-PCS | Mod: CPTII,S$GLB,, | Performed by: OBSTETRICS & GYNECOLOGY

## 2023-12-19 PROCEDURE — 1159F PR MEDICATION LIST DOCUMENTED IN MEDICAL RECORD: ICD-10-PCS | Mod: CPTII,S$GLB,, | Performed by: OBSTETRICS & GYNECOLOGY

## 2023-12-19 PROCEDURE — 3078F DIAST BP <80 MM HG: CPT | Mod: CPTII,S$GLB,, | Performed by: OBSTETRICS & GYNECOLOGY

## 2023-12-19 PROCEDURE — 99214 PR OFFICE/OUTPT VISIT, EST, LEVL IV, 30-39 MIN: ICD-10-PCS | Mod: S$GLB,,, | Performed by: OBSTETRICS & GYNECOLOGY

## 2023-12-19 PROCEDURE — 99999 PR PBB SHADOW E&M-EST. PATIENT-LVL III: ICD-10-PCS | Mod: PBBFAC,,, | Performed by: OBSTETRICS & GYNECOLOGY

## 2023-12-19 PROCEDURE — 3008F PR BODY MASS INDEX (BMI) DOCUMENTED: ICD-10-PCS | Mod: CPTII,S$GLB,, | Performed by: OBSTETRICS & GYNECOLOGY

## 2023-12-19 PROCEDURE — 1160F PR REVIEW ALL MEDS BY PRESCRIBER/CLIN PHARMACIST DOCUMENTED: ICD-10-PCS | Mod: CPTII,S$GLB,, | Performed by: OBSTETRICS & GYNECOLOGY

## 2023-12-19 PROCEDURE — 3078F PR MOST RECENT DIASTOLIC BLOOD PRESSURE < 80 MM HG: ICD-10-PCS | Mod: CPTII,S$GLB,, | Performed by: OBSTETRICS & GYNECOLOGY

## 2023-12-19 PROCEDURE — 1160F RVW MEDS BY RX/DR IN RCRD: CPT | Mod: CPTII,S$GLB,, | Performed by: OBSTETRICS & GYNECOLOGY

## 2023-12-19 PROCEDURE — 87491 CHLMYD TRACH DNA AMP PROBE: CPT | Performed by: OBSTETRICS & GYNECOLOGY

## 2023-12-19 RX ORDER — PRENATAL 71/IRON/FOLIC AC/DHA 30-1.4-2
1 CAPSULE,IMMEDIATE, DELAY RELEASE,BIPHASE ORAL DAILY
Qty: 180 EACH | Refills: 1 | Status: SHIPPED | OUTPATIENT
Start: 2023-12-19

## 2023-12-19 NOTE — PROGRESS NOTES
Jesica Noe is a 23 y.o. , presents today for amenorrhea.    Has not seen any other provider for this possible pregnancy.     C/C: amenorrhea    HPI: Reports amenorrhea since Patient's last menstrual period was 2023 (exact date).. Prior to LMP, menses were regular occuring every 28-30 days prior to this.  She is not currently on any contraception. She denies nausea and vomiting. Has noticed breast tenderness. Denies vaginal bleeding since LMP.    SOCIAL HISTORY: Denies emotional/mental/physical/sexual violence or abuse. Feels safe at home. This is her first baby for both of them.     PAP HISTORY: last pap was normal .She denies any history of abnormal pap smear or STDs.     She denies long-term chronic medical conditions    Review of patient's allergies indicates:  No Known Allergies  Past Medical History:   Diagnosis Date    Allergy     Asthma      History reviewed. No pertinent surgical history.  History reviewed. No pertinent surgical history.  OB History    Para Term  AB Living   1             SAB IAB Ectopic Multiple Live Births                  # Outcome Date GA Lbr Abner/2nd Weight Sex Delivery Anes PTL Lv   1 Current              OB History          1    Para        Term                AB        Living             SAB        IAB        Ectopic        Multiple        Live Births                   Social History     Socioeconomic History    Marital status: Single   Tobacco Use    Smoking status: Never    Smokeless tobacco: Never   Substance and Sexual Activity    Alcohol use: No    Drug use: No    Sexual activity: Yes     Partners: Male     Birth control/protection: Injection     Family History   Problem Relation Age of Onset    Asthma Mother     Hypertension Mother     ADD / ADHD Father     Asthma Maternal Grandmother     Cancer Maternal Grandmother         uterus     Heart disease Maternal Grandmother     Cancer Maternal Grandfather         prostates     Cancer  Paternal Grandmother         uterus     Colon cancer Paternal Grandfather     Esophageal cancer Neg Hx      Social History     Substance and Sexual Activity   Sexual Activity Yes    Partners: Male    Birth control/protection: Injection       GENETIC SCREENING   Patient's age 35 years or older as of estimated date of delivery? no  Neural tube defect (meningomyelocele, spina bifida, or anencephaly)? no  Down syndrome? no  Soren-Sachs (Ashkenazi Christianity, Cajun, Turkish [place])? no  Canavan disease (Ashkenazi Christianity)? no  Familial dysautonomia (Ashkenazi Christianity)? no  Sickle cell disease or trait ()? no  Hemophilia or other blood disorders? no  Cystic fibrosis? no  Muscular dystrophy? no  [place]'s chorea? no  Thalassemia (Italian, Greek, Mediterranean, or  background) MCV less than 80? no  Congenital heart defect? no  Mental retardation/autism? no   If Yes, was person tested for Fragile X? no  Other inherited genetic or chromosomal disorder? no  Maternal metabolic disorder (e.g. type 1 diabetes, PKU)? no  Patient or baby's father had a child with birth defects not listed above? no  Recurrent pregnancy loss or a stillbirth: no  Medications (including supplements, vitamins, herbs or OTC drugs)/illicit/recreational drugs/alcohol since last menstrual period? no    Cristina Baca MD     ROS:  Constitutional/Gen: Denies fevers, chills, malaise, or weight loss. reports fatigue   Psych: Denies depression, anxiety  CV/vasc: Denies heart palpitations or edema  Resp: Denies SOB or dyspnea  Breasts: Denies mass, nipple discharge, or trauma. reports breast tenderness.  GI: Denies constipation, diarrhea, or vomiting. denies nausea.  : Denies vaginal discharge, dysuria or pelvic pain. denies urinary frequency      OBJECTIVE:  /70   Wt 67.4 kg (148 lb 9.4 oz)   LMP 11/05/2023 (Exact Date)   BMI 23.27 kg/m²   Constitutional/Gen: NAD, appears stated age  Lung: normal resp effort, CTAB  Heart: normal HR, RRR    Back: negative CVAT  Abdomen: soft, nontender, no masses, and bowel sounds normal, no enlargement  External genitalia: no lesions or discharge, normal hair distribution  Vagina: normal appearance, no lesions, no discharge, no evidence cystocele or rectocele.  Extremities: FROM, with no edema or tenderness.  Neurologic: A&O x 3  Psych: affect appropriate and without signs of mood, thought or memory difficulty appreciated      UPT positive in office    ASSESSMENT:  23 y.o. female  with amenorrhea  Likely at 6w2d via LMP;  Body mass index is 23.27 kg/m².  Patient Active Problem List   Diagnosis    Asthma    Chronic midline thoracic back pain       PLAN:  1. Amenorrhea  -- + UPT in office, Patient's last menstrual period was 2023 (exact date). --> Estimated Date of Delivery: 24  -- Dating US ordered  -- Anatomical US 19-20w  -- Routine serum and urine prenatal labs today    2. Physical exam today  -- Discussed ASCCP pap guidelines. Last pap     3. BMI 23.27  -- Discussed IOM recommended weight gain of:   Underweight Less than 18.5 28-40    Normal Weight 18.5-24.9 25-35    Overweight 25-29.9  15-25    Obese   30 and greater -20   -- Discussed criteria for delivery at [facility] r/t excessive pre-preg weight or excessive weight gain:   Pre-pregnancy BMI over 40 or excess pregnancy weight gain defined as:   Pre-preg BMI < 18.5; Excess weight gain = > 60 pound   Pre-preg BMI 18.5-24.9;  Excess weight gain = > 53 pounds   Pre-preg BMI 25-29.9;  Excess weight gain = > 38 pounds   Pre-preg BMI > 30;  Excess weight gain = > 30 pounds    4. Discussed nausea and vomiting in pregnancy  -- Education regarding lifestyle and dietary modifications  -- Reviewed use of B6/Unisom prn. Pt will notify us if no relief/worsening symptoms, will consider alternative therapies prn    5. Pregnancy education and couseling; handouts and booklet provided  -- Oriented to practice and anticipated prenatal course of care and how to  contact us  -- Precautions/warning signs reviewed  -- Common complaints of pregnancy  -- Routine prenatal labs including HIV  -- Ultrasounds  -- Nutrition, prepregnant BMI, and recommended weight gain  -- Toxoplasmosis precautions (Cats/Raw Meat)  -- Sexual activity and exercise  -- Environmental/Work hazards  -- Travel  -- Tobacco (Ask, Advise, Assess, Assist, and Arrange), as well as alcohol and drug use  -- Use of any medications (Including supplements, Vitamins, Herbs, or OTC Drugs)  -- Domestic violence screen negative    6. Reviewed genetic testing options. Reviewed available first trimester and/or second  trimester screening options. Reviewed risk of false positive/negative results and recommendation of referral to [facility] in event of a positive result, for NIPT, US, and/or amniocentesis. Reviewed the possible estimated 1 in 300/500 risk of miscarriage with amniocentesis, even with a healthy fetus.     RTC x 4 weeks, call or present sooner prn.     Updated Medication List:  Current Outpatient Medications   Medication Sig Dispense Refill    acetaminophen (TYLENOL) 500 MG tablet Take 1 tablet (500 mg total) by mouth every 4 (four) hours as needed for Temperature greater than (100.5 or greater). 30 tablet 0    guaiFENesin 100 mg/5 ml (ROBITUSSIN) 100 mg/5 mL syrup Take 10 mLs (200 mg total) by mouth every 4 (four) hours as needed for Cough. 118 mL 0    phenoL (CHLORASEPTIC THROAT SPRAY) 1.4 % SprA by Mucous Membrane route every 2 (two) hours. 177 mL 0    albuterol (PROAIR HFA) 90 mcg/actuation inhaler Inhale 2 puffs into the lungs every 4 (four) hours as needed. (Patient not taking: Reported on 4/13/2023) 1 Inhaler 12    benzocaine-menthoL 15-3.6 mg Lozg 1 lozenge by Mucous Membrane route every 2 (two) hours as needed (sore throat). 16 lozenge 0    famotidine (PEPCID) 40 MG tablet Take 0.5 tablets (20 mg total) by mouth 2 (two) times daily. (Patient not taking: Reported on 11/30/2023) 30 tablet 11    fluticasone  propionate (FLONASE) 50 mcg/actuation nasal spray 1 spray (50 mcg total) by Each Nare route once daily. (Patient not taking: Reported on 11/30/2023) 16 g 5    ibuprofen (ADVIL,MOTRIN) 400 MG tablet TAKE 1 TABLET(400 MG) BY MOUTH EVERY 6 HOURS AS NEEDED (Patient not taking: Reported on 11/30/2023) 385 tablet 2    triamcinolone acetonide 0.1% (KENALOG) 0.1 % cream Apply topically 2 (two) times daily. for 10 days (Patient not taking: Reported on 8/18/2022) 15 g 1     No current facility-administered medications for this visit.         Jayla Eugene MD  12/19/2023 1:38 PM

## 2023-12-20 ENCOUNTER — PATIENT MESSAGE (OUTPATIENT)
Dept: MATERNAL FETAL MEDICINE | Facility: CLINIC | Age: 23
End: 2023-12-20
Payer: COMMERCIAL

## 2023-12-20 LAB
C TRACH DNA SPEC QL NAA+PROBE: NOT DETECTED
N GONORRHOEA DNA SPEC QL NAA+PROBE: NOT DETECTED

## 2023-12-21 DIAGNOSIS — N76.0 BV (BACTERIAL VAGINOSIS): Primary | ICD-10-CM

## 2023-12-21 DIAGNOSIS — B96.89 BV (BACTERIAL VAGINOSIS): Primary | ICD-10-CM

## 2023-12-21 RX ORDER — METRONIDAZOLE 7.5 MG/G
1 GEL VAGINAL 2 TIMES DAILY
Qty: 70 G | Refills: 0 | Status: SHIPPED | OUTPATIENT
Start: 2023-12-21

## 2024-01-02 ENCOUNTER — TELEPHONE (OUTPATIENT)
Dept: OBSTETRICS AND GYNECOLOGY | Facility: CLINIC | Age: 24
End: 2024-01-02
Payer: COMMERCIAL

## 2024-01-02 ENCOUNTER — ON-DEMAND VIRTUAL (OUTPATIENT)
Dept: URGENT CARE | Facility: CLINIC | Age: 24
End: 2024-01-02
Payer: COMMERCIAL

## 2024-01-02 DIAGNOSIS — O21.9 NAUSEA AND VOMITING IN PREGNANCY: Primary | ICD-10-CM

## 2024-01-02 DIAGNOSIS — O21.9 NAUSEA AND VOMITING IN PREGNANCY: ICD-10-CM

## 2024-01-02 PROCEDURE — 99213 OFFICE O/P EST LOW 20 MIN: CPT | Mod: 95,,, | Performed by: PHYSICIAN ASSISTANT

## 2024-01-02 RX ORDER — ONDANSETRON 4 MG/1
2 TABLET, ORALLY DISINTEGRATING ORAL EVERY 6 HOURS PRN
Status: CANCELLED | OUTPATIENT
Start: 2024-01-02

## 2024-01-02 RX ORDER — ONDANSETRON 4 MG/1
2 TABLET, ORALLY DISINTEGRATING ORAL EVERY 6 HOURS PRN
COMMUNITY
Start: 2023-12-23 | End: 2024-01-02 | Stop reason: SDUPTHER

## 2024-01-02 RX ORDER — ONDANSETRON 4 MG/1
4 TABLET, ORALLY DISINTEGRATING ORAL EVERY 8 HOURS PRN
Qty: 12 TABLET | Refills: 0 | Status: SHIPPED | OUTPATIENT
Start: 2024-01-02

## 2024-01-02 RX ORDER — ONDANSETRON 4 MG/1
4 TABLET, ORALLY DISINTEGRATING ORAL EVERY 8 HOURS PRN
Qty: 60 TABLET | Refills: 0 | Status: SHIPPED | OUTPATIENT
Start: 2024-01-02 | End: 2024-01-02 | Stop reason: SDUPTHER

## 2024-01-02 NOTE — PROGRESS NOTES
Subjective:      Patient ID: Jesica Noe is a 23 y.o. female.    Vitals:  vitals were not taken for this visit.     Chief Complaint: Nausea      Visit Type: TELE AUDIOVISUAL    Present with the patient at the time of consultation: TELEMED PRESENT WITH PATIENT: None    Past Medical History:   Diagnosis Date    Allergy     Asthma      History reviewed. No pertinent surgical history.  Review of patient's allergies indicates:  No Known Allergies  Current Outpatient Medications on File Prior to Visit   Medication Sig Dispense Refill    acetaminophen (TYLENOL) 500 MG tablet Take 1 tablet (500 mg total) by mouth every 4 (four) hours as needed for Temperature greater than (100.5 or greater). 30 tablet 0    albuterol (PROAIR HFA) 90 mcg/actuation inhaler Inhale 2 puffs into the lungs every 4 (four) hours as needed. (Patient not taking: Reported on 4/13/2023) 1 Inhaler 12    benzocaine-menthoL 15-3.6 mg Lozg 1 lozenge by Mucous Membrane route every 2 (two) hours as needed (sore throat). 16 lozenge 0    famotidine (PEPCID) 40 MG tablet Take 0.5 tablets (20 mg total) by mouth 2 (two) times daily. (Patient not taking: Reported on 11/30/2023) 30 tablet 11    fluticasone propionate (FLONASE) 50 mcg/actuation nasal spray 1 spray (50 mcg total) by Each Nare route once daily. (Patient not taking: Reported on 11/30/2023) 16 g 5    metroNIDAZOLE (METROGEL) 0.75 % (37.5mg/5 gram) vaginal gel Place 1 applicator vaginally 2 (two) times daily. 70 g 0    phenoL (CHLORASEPTIC THROAT SPRAY) 1.4 % SprA by Mucous Membrane route every 2 (two) hours. 177 mL 0    prenatal71-iron-folic acid-dha (PRENA1 ELSA) 30-1.4-200 mg CpID Take 1 tablet by mouth once daily. 180 each 1    triamcinolone acetonide 0.1% (KENALOG) 0.1 % cream Apply topically 2 (two) times daily. for 10 days (Patient not taking: Reported on 8/18/2022) 15 g 1    [DISCONTINUED] ondansetron (ZOFRAN-ODT) 4 MG TbDL Take 2 mg by mouth every 6 (six) hours as needed.      [DISCONTINUED]  ondansetron (ZOFRAN-ODT) 4 MG TbDL Take 1 tablet (4 mg total) by mouth every 8 (eight) hours as needed (nausea). 60 tablet 0     No current facility-administered medications on file prior to visit.     Family History   Problem Relation Age of Onset    Asthma Mother     Hypertension Mother     ADD / ADHD Father     Asthma Maternal Grandmother     Cancer Maternal Grandmother         uterus     Heart disease Maternal Grandmother     Cancer Maternal Grandfather         prostates     Cancer Paternal Grandmother         uterus     Colon cancer Paternal Grandfather     Esophageal cancer Neg Hx        Medications Ordered                [id number] - [place] [address] [facility] [address] [place] [address]    Telephone: [phone]   Fax: [phone]   Hours: Not open 24 hours                         E-Prescribed (1 of 1)              ondansetron (ZOFRAN-ODT) 4 MG TbDL    Sig: Take 1 tablet (4 mg total) by mouth every 8 (eight) hours as needed (nausea).       Start: 1/2/24     Quantity: 12 tablet Refills: 0                           Ohs Peq Odvv Intake    1/2/2024  5:45 PM CST - Filed by Patient   Describe your reason for todays visit Nausea   What is your current physical address in the event of a medical emergency? [address] [place], [place]   Are you able to take your vital signs? No   Please attach any relevant images or files          HPI  22yo 5 week pregnant female presents for follow up. Was suppose to get nausea medication from OB but pharmacy states they don't have it. Rx looks like it was sent for zofran.     States she has been having issues with nausea and vomiting. Denies any other symptoms.          Constitution: Negative for activity change, appetite change, chills and fever.   Respiratory:  Negative for cough and shortness of breath.    Gastrointestinal:  Positive for nausea and vomiting. Negative for abdominal pain and diarrhea.   Genitourinary:   Negative for dysuria, frequency, urgency, hematuria, vaginal discharge, vaginal bleeding and pelvic pain.   Skin:  Negative for rash.   Neurological:  Negative for headaches.        Objective:   The physical exam was conducted virtually.  Physical Exam   Constitutional: She is oriented to person, place, and time.  Non-toxic appearance. She does not appear ill. No distress.   HENT:   Head: Normocephalic and atraumatic.   Eyes: Conjunctivae are normal. No scleral icterus.   Neck: Neck supple.   Pulmonary/Chest: Effort normal. No respiratory distress.   Abdominal: Normal appearance.   Neurological: She is alert and oriented to person, place, and time. Coordination normal.   Skin: Skin is dry, not diaphoretic, not pale and no rash. jaundice  Psychiatric: Her behavior is normal. Judgment and thought content normal.       Assessment:     1. Nausea and vomiting in pregnancy        Plan:       Nausea and vomiting in pregnancy  -     ondansetron (ZOFRAN-ODT) 4 MG TbDL; Take 1 tablet (4 mg total) by mouth every 8 (eight) hours as needed (nausea).  Dispense: 12 tablet; Refill: 0      1. I have resent the prescription for ondansetron to take as needed for nausea/vomiting.  2. If no improvement, please follow up with OB or local urgent care for in person evaluation. If develop blood in vomit, trouble breathing, dizziness/lightheadedness, fevers, abdominal or pelvic pain, vaginal bleeding go to the emergency room.  3. You must understand that you've received a Telehealth Urgent Care treatment only and that you may be released before all your medical problems are known or treated. You, the patient, will arrange for follow up care as instructed.  Patient voiced understanding and agrees to plan.

## 2024-01-02 NOTE — TELEPHONE ENCOUNTER
----- Message from Juan Antonio Etienne sent at 1/2/2024 10:59 AM CST -----  Regarding: self  Type: Patient Call Back    Who called:self    What is the request in detail:pt needs to speak with someone in the office about her 2 medications that was denied     Can the clinic reply by [facility]?no    Would the patient rather a call back or a response via [facility]? callback    Best call back number:[phone]    Additional Information:

## 2024-01-03 NOTE — PATIENT INSTRUCTIONS
1. I have resent the prescription for ondansetron to take as needed for nausea/vomiting.  2. If no improvement, please follow up with OB or local urgent care for in person evaluation. If develop blood in vomit, trouble breathing, dizziness/lightheadedness, fevers, abdominal or pelvic pain, vaginal bleeding go to the emergency room.  3. You must understand that you've received a Telehealth Urgent Care treatment only and that you may be released before all your medical problems are known or treated. You, the patient, will arrange for follow up care as instructed.

## 2024-01-11 ENCOUNTER — PROCEDURE VISIT (OUTPATIENT)
Dept: MATERNAL FETAL MEDICINE | Facility: CLINIC | Age: 24
End: 2024-01-11
Payer: COMMERCIAL

## 2024-01-11 ENCOUNTER — PATIENT MESSAGE (OUTPATIENT)
Dept: MATERNAL FETAL MEDICINE | Facility: CLINIC | Age: 24
End: 2024-01-11

## 2024-01-11 DIAGNOSIS — Z32.01 PREGNANCY CONFIRMED BY POSITIVE URINE TEST: ICD-10-CM

## 2024-01-11 DIAGNOSIS — Z36.89 ENCOUNTER FOR FETAL ANATOMIC SURVEY: Primary | ICD-10-CM

## 2024-01-11 PROCEDURE — 76801 OB US < 14 WKS SINGLE FETUS: CPT | Mod: S$GLB,,, | Performed by: STUDENT IN AN ORGANIZED HEALTH CARE EDUCATION/TRAINING PROGRAM

## 2024-01-11 PROCEDURE — 76817 TRANSVAGINAL US OBSTETRIC: CPT | Mod: S$GLB,,, | Performed by: STUDENT IN AN ORGANIZED HEALTH CARE EDUCATION/TRAINING PROGRAM

## 2024-01-24 ENCOUNTER — HOSPITAL ENCOUNTER (EMERGENCY)
Facility: HOSPITAL | Age: 24
Discharge: HOME | End: 2024-01-25
Attending: EMERGENCY MEDICINE
Payer: COMMERCIAL

## 2024-01-24 DIAGNOSIS — O03.4 INCOMPLETE ABORTION: Primary | ICD-10-CM

## 2024-01-24 DIAGNOSIS — N93.9 VAGINAL BLEEDING: ICD-10-CM

## 2024-01-24 LAB
ABO + RH BLD: NORMAL
ALBUMIN SERPL BCP-MCNC: 3.7 G/DL (ref 3.5–5.2)
ALP SERPL-CCNC: 74 U/L (ref 55–135)
ALT SERPL W/O P-5'-P-CCNC: 29 U/L (ref 10–44)
AMORPH CRY URNS QL MICRO: ABNORMAL
ANION GAP SERPL CALC-SCNC: 6 MMOL/L (ref 8–16)
AST SERPL-CCNC: 22 U/L (ref 10–40)
B-HCG UR QL: POSITIVE
BACTERIA #/AREA URNS HPF: ABNORMAL /HPF
BASOPHILS # BLD AUTO: 0.04 K/UL (ref 0–0.2)
BASOPHILS NFR BLD: 0.3 % (ref 0–1.9)
BILIRUB SERPL-MCNC: 0.3 MG/DL (ref 0.1–1)
BILIRUB UR QL STRIP: NEGATIVE
BUN SERPL-MCNC: 8 MG/DL (ref 6–20)
CALCIUM SERPL-MCNC: 8.9 MG/DL (ref 8.7–10.5)
CHLORIDE SERPL-SCNC: 108 MMOL/L (ref 95–110)
CLARITY UR: ABNORMAL
CO2 SERPL-SCNC: 24 MMOL/L (ref 23–29)
COLOR UR: ABNORMAL
CREAT SERPL-MCNC: 0.7 MG/DL (ref 0.5–1.4)
CTP QC/QA: YES
DIFFERENTIAL METHOD BLD: ABNORMAL
EOSINOPHIL # BLD AUTO: 0.1 K/UL (ref 0–0.5)
EOSINOPHIL NFR BLD: 0.7 % (ref 0–8)
ERYTHROCYTE [DISTWIDTH] IN BLOOD BY AUTOMATED COUNT: 12.6 % (ref 11.5–14.5)
EST. GFR  (NO RACE VARIABLE): >60 ML/MIN/1.73 M^2
GLUCOSE SERPL-MCNC: 96 MG/DL (ref 70–110)
GLUCOSE UR QL STRIP: NEGATIVE
HCG INTACT+B SERPL-ACNC: NORMAL MIU/ML
HCT VFR BLD AUTO: 34 % (ref 37–48.5)
HGB BLD-MCNC: 11.4 G/DL (ref 12–16)
HGB UR QL STRIP: ABNORMAL
IMM GRANULOCYTES # BLD AUTO: 0.08 K/UL (ref 0–0.04)
IMM GRANULOCYTES NFR BLD AUTO: 0.6 % (ref 0–0.5)
KETONES UR QL STRIP: NEGATIVE
LEUKOCYTE ESTERASE UR QL STRIP: ABNORMAL
LYMPHOCYTES # BLD AUTO: 1.7 K/UL (ref 1–4.8)
LYMPHOCYTES NFR BLD: 12.1 % (ref 18–48)
MAGNESIUM SERPL-MCNC: 1.9 MG/DL (ref 1.6–2.6)
MCH RBC QN AUTO: 29.7 PG (ref 27–31)
MCHC RBC AUTO-ENTMCNC: 33.5 G/DL (ref 32–36)
MCV RBC AUTO: 89 FL (ref 82–98)
MICROSCOPIC COMMENT: ABNORMAL
MONOCYTES # BLD AUTO: 1 K/UL (ref 0.3–1)
MONOCYTES NFR BLD: 7.2 % (ref 4–15)
NEUTROPHILS # BLD AUTO: 10.8 K/UL (ref 1.8–7.7)
NEUTROPHILS NFR BLD: 79.1 % (ref 38–73)
NITRITE UR QL STRIP: NEGATIVE
NRBC BLD-RTO: 0 /100 WBC
PH UR STRIP: >8 [PH] (ref 5–8)
PLATELET # BLD AUTO: 225 K/UL (ref 150–450)
PMV BLD AUTO: 10 FL (ref 9.2–12.9)
POTASSIUM SERPL-SCNC: 4.1 MMOL/L (ref 3.5–5.1)
PROT SERPL-MCNC: 7.1 G/DL (ref 6–8.4)
PROT UR QL STRIP: ABNORMAL
RBC # BLD AUTO: 3.84 M/UL (ref 4–5.4)
RBC #/AREA URNS HPF: >100 /HPF (ref 0–4)
SODIUM SERPL-SCNC: 138 MMOL/L (ref 136–145)
SP GR UR STRIP: 1.02 (ref 1–1.03)
SQUAMOUS #/AREA URNS HPF: 1 /HPF
URN SPEC COLLECT METH UR: ABNORMAL
UROBILINOGEN UR STRIP-ACNC: NEGATIVE EU/DL
WBC # BLD AUTO: 13.61 K/UL (ref 3.9–12.7)
WBC #/AREA URNS HPF: 3 /HPF (ref 0–5)

## 2024-01-24 PROCEDURE — 80053 COMPREHEN METABOLIC PANEL: CPT | Performed by: PHYSICIAN ASSISTANT

## 2024-01-24 PROCEDURE — 81000 URINALYSIS NONAUTO W/SCOPE: CPT | Performed by: PHYSICIAN ASSISTANT

## 2024-01-24 PROCEDURE — 83735 ASSAY OF MAGNESIUM: CPT | Performed by: PHYSICIAN ASSISTANT

## 2024-01-24 PROCEDURE — 86901 BLOOD TYPING SEROLOGIC RH(D): CPT | Performed by: PHYSICIAN ASSISTANT

## 2024-01-24 PROCEDURE — 81025 URINE PREGNANCY TEST: CPT

## 2024-01-24 PROCEDURE — 84702 CHORIONIC GONADOTROPIN TEST: CPT | Performed by: PHYSICIAN ASSISTANT

## 2024-01-24 PROCEDURE — 85025 COMPLETE CBC W/AUTO DIFF WBC: CPT | Performed by: PHYSICIAN ASSISTANT

## 2024-01-24 PROCEDURE — 99284 EMERGENCY DEPT VISIT MOD MDM: CPT | Mod: 25

## 2024-01-25 VITALS
TEMPERATURE: 99 F | SYSTOLIC BLOOD PRESSURE: 133 MMHG | RESPIRATION RATE: 18 BRPM | DIASTOLIC BLOOD PRESSURE: 83 MMHG | HEART RATE: 66 BPM | OXYGEN SATURATION: 98 %

## 2024-01-25 PROCEDURE — 25000003 PHARM REV CODE 250: Performed by: PHYSICIAN ASSISTANT

## 2024-01-25 RX ORDER — MISOPROSTOL 200 UG/1
800 TABLET ORAL
Status: COMPLETED | OUTPATIENT
Start: 2024-01-25 | End: 2024-01-25

## 2024-01-25 RX ORDER — OXYCODONE AND ACETAMINOPHEN 5; 325 MG/1; MG/1
1 TABLET ORAL EVERY 6 HOURS PRN
Qty: 12 TABLET | Refills: 0 | Status: SHIPPED | OUTPATIENT
Start: 2024-01-25

## 2024-01-25 RX ADMIN — MISOPROSTOL 800 MCG: 200 TABLET ORAL at 12:01

## 2024-01-25 NOTE — ED TRIAGE NOTES
Pt presents to ED c/o vaginal bleeding, abd pain, back pain and pelvic pain.  Pt reports going to a clinic on 01/17/24 and taking oral abortive medicine.  Pt reports passing large clots. Denies any other symptoms.

## 2024-01-25 NOTE — DISCHARGE INSTRUCTIONS
Continue with ibuprofen, Tylenol as needed for pain.  Percocet only for severe or breakthrough pain. Be aware, this medication is sedating.  Do not mix with alcohol or any other sedating medications.  Do not drive or operate machinery when taking this medication.     Follow-up with  for re-evaluation, to ensure completion of the miscarriage.    Please return to this ED if you develop shortness of breath, racing heartbeat, if you feel lightheaded or feel as if you are going to pass out, if unable to tolerate abdominal pain, if unable to eat or drink, if you begin with constant heavy bleeding, if any other problems occur.

## 2024-01-25 NOTE — ED NOTES
Post  paper work given to provider, patient states she doesn't not want to sign the form despite conversation with MEG Pires. OB notified per provider. Charge nurse notified.

## 2024-01-25 NOTE — ED PROVIDER NOTES
Encounter Date: 1/24/2024       History     Chief Complaint   Patient presents with    Vaginal Bleeding     Miscarried one week ago now continues with pain and moderate vaginal bleeding     22yo F, G1, approx 11w4d gravid by u/s (1/11), presents to ED with vaginal bleeding, pelvic pain.     Undesired pregnancy. Went to a local clinic on 1/17, had oral abortive medicine (prostaglandin?). States on 1/18 she was given a dose of both oral and vaginal medication to be used 24hrs after initial dose. States began with heavy vaginal bleeding, pelvic pain following these medications on 1/18.  She states that her friend pulled out what looks like a fetus along with irregular tissue.  States mild pelvic soreness, vaginal spotting over the next 2 days.  Began on/around 1/21 with heavy vaginal bleeding with each bowel movement.  Admits to vaginal spotting throughout the day, continued mild pelvic soreness.  Vaginal bleeding has become heavier and more prolonged with each bowel movement over the past few days.  Admits to very heavy bleeding today, prompting ED visit.  Admits to associated low back pain over the past few days as well.  Has been taking hydrocodone without relief of pain.  She does admit to decreased appetite and intake since onset of symptoms.  No nausea vomiting.  Denies melena or hematochezia, admits to vaginal bleeding.  No urinary complaints.  No fever chills myalgias.  Symptoms are acute, constant, mild to moderate.  No alleviating factors.  No radiation symptoms.  Denies history of anemia.  No syncope or near-syncope.  No shortness of breath.    No history of any abdominal surgeries        PMH:  Asthma      Review of patient's allergies indicates:  No Known Allergies  Past Medical History:   Diagnosis Date    Allergy     Asthma      History reviewed. No pertinent surgical history.  Family History   Problem Relation Age of Onset    Asthma Mother     Hypertension Mother     ADD / ADHD Father      Asthma Maternal Grandmother     Cancer Maternal Grandmother         uterus     Heart disease Maternal Grandmother     Cancer Maternal Grandfather         prostates     Cancer Paternal Grandmother         uterus     Colon cancer Paternal Grandfather     Esophageal cancer Neg Hx      Social History     Tobacco Use    Smoking status: Never    Smokeless tobacco: Never   Substance Use Topics    Alcohol use: No    Drug use: No     Review of Systems   Constitutional:  Positive for appetite change. Negative for chills and fever.   Respiratory:  Negative for shortness of breath.    Gastrointestinal:  Positive for abdominal pain. Negative for constipation, diarrhea, nausea and vomiting.   Genitourinary:  Positive for pelvic pain and vaginal bleeding. Negative for dysuria.   Musculoskeletal:  Positive for back pain.   Neurological:  Negative for syncope.       Physical Exam     Initial Vitals [01/24/24 1859]   BP Pulse Resp Temp SpO2   112/68 (!) 58 18 99.3 °F (37.4 °C) 100 %      MAP       --         Physical Exam    Nursing note and vitals reviewed.  Constitutional: She appears well-developed and well-nourished. She is not diaphoretic. No distress.   Well-appearing, nontoxic.   HENT:   Head: Normocephalic and atraumatic.   Eyes:   No conjunctival pallor   Neck: Neck supple.   Normal range of motion.  Cardiovascular:  Normal rate and regular rhythm.           Pulmonary/Chest: No respiratory distress.   Abdominal: Abdomen is soft. Bowel sounds are normal.   Mild, generalized gaseous distention.  Abdomen overall soft.  There is tenderness to the suprapubic abdomen.  No flank tenderness.   Musculoskeletal:         General: No tenderness. Normal range of motion.      Cervical back: Normal range of motion and neck supple.     Neurological: She is alert and oriented to person, place, and time. GCS score is 15. GCS eye subscore is 4. GCS verbal subscore is 5. GCS motor subscore is 6.   Skin: Skin is warm.   Psychiatric: She has a  normal mood and affect. Thought content normal.         ED Course   Procedures  Labs Reviewed   CBC W/ AUTO DIFFERENTIAL - Abnormal; Notable for the following components:       Result Value    WBC 13.61 (*)     RBC 3.84 (*)     Hemoglobin 11.4 (*)     Hematocrit 34.0 (*)     Immature Granulocytes 0.6 (*)     Gran # (ANC) 10.8 (*)     Immature Grans (Abs) 0.08 (*)     Gran % 79.1 (*)     Lymph % 12.1 (*)     All other components within normal limits   COMPREHENSIVE METABOLIC PANEL - Abnormal; Notable for the following components:    Anion Gap 6 (*)     All other components within normal limits   URINALYSIS, REFLEX TO URINE CULTURE - Abnormal; Notable for the following components:    Color, UA Orange (*)     Appearance, UA Hazy (*)     pH, UA >8.0 (*)     Protein, UA Trace (*)     Occult Blood UA 3+ (*)     Leukocytes, UA Trace (*)     All other components within normal limits    Narrative:     Specimen Source->Urine   URINALYSIS MICROSCOPIC - Abnormal; Notable for the following components:    RBC, UA >100 (*)     All other components within normal limits    Narrative:     Specimen Source->Urine   POCT URINE PREGNANCY - Abnormal; Notable for the following components:    POC Preg Test, Ur Positive (*)     All other components within normal limits   MAGNESIUM    Narrative:     Release to patient->Immediate   HCG, QUANTITATIVE    Narrative:     Release to patient->Immediate   GROUP & RH          Imaging Results              US OB <14 Wks TransAbd & TransVag, Single Gestation (XPD) (Final result)  Result time 01/25/24 00:09:11      Final result by Caitlin De Los Santos MD (01/25/24 00:09:11)                   Impression:      No intrauterine pregnancy seen.  Complex material and/or blood products are seen in the lower uterine segment and cervical region which may represent retained products of conception.      Electronically signed by: Caitlin De Los Santos MD  Date:    01/25/2024  Time:    00:09               Narrative:     "EXAMINATION:  US OB <14 WEEKS, TRANSABDOM & TRANSVAG, SINGLE GESTATION (XPD)    CLINICAL HISTORY:  r/o retained products; recent elective AB on , pelvic pain, vaginal bleeding;    TECHNIQUE:  Transabdominal sonography of the pelvis was performed, followed by transvaginal sonography to better evaluate the uterus and ovaries.    COMPARISON:  None.    FINDINGS:  The uterus measures 8 x 5 x 6 cm. Uterine parenchyma is heterogenous in echotexture.  No intrauterine pregnancy or gestational sac is seen.  There is heterogenous thickening of the endometrium measuring at least 22 mm.  Complex material and/or blood products are seen in the lower uterine segment and cervical region.    The right ovary measures 5 x 2 x 3 cm. The left ovary measures 3 x 2 x 2 cm. Arterial and venous flow are preserved bilaterally. A suspected corpus luteum cyst measuring 2 cm is seen in the right ovary. No significant free fluid is seen.                                       Medications   miSOPROStoL tablet 800 mcg (800 mcg Oral Given 24 0056)     Medical Decision Making  Differential diagnosis:  Retained products of conception, incomplete AB, menses    Amount and/or Complexity of Data Reviewed  External Data Reviewed: notes.  Labs: ordered. Decision-making details documented in ED Course.  Radiology: ordered and independent interpretation performed. Decision-making details documented in ED Course.  Discussion of management or test interpretation with external provider(s): Attempted to complete the "individual complication report for post- care", after discussion with patient she does not want to give any further information about what facility she presented to for medications to have the elective .    Retained products on ultrasound, no significant anemia, continues with bleeding with any straining or Valsalva.  Called and discussed case with ; given patient has already attempted elective ab as an " outpatient, this is not a desired pregnancy, I will offer another dose of Cytotec to complete ab.  Patient willing to take another dose.  We have discussed need for prompt outpatient follow-up to ensure complete .  We have discussed signs and symptoms of blood loss anemia, worsening pain, etc..  Patient is comfortable with current plan.    Risk  Prescription drug management.                                      Clinical Impression:  Final diagnoses:  [O03.4] Incomplete  (Primary)  [N93.9] Vaginal bleeding          ED Disposition Condition    Discharge Stable          ED Prescriptions       Medication Sig Dispense Start Date End Date Auth. Provider    oxyCODONE-acetaminophen (PERCOCET) 5-325 mg per tablet Take 1 tablet by mouth every 6 (six) hours as needed (severe pain). 12 tablet 2024 -- Pranay Hawthorne PA-C          Follow-up Information       Follow up With Specialties Details Why Contact Info    Jayla Minor MD Obstetrics and Gynecology Schedule an appointment as soon as possible for a visit  For reevaluation [address]  [address] [place] [id number]  [phone]               Pranay Hawthorne PA-C  [phone]

## 2024-01-26 ENCOUNTER — TELEPHONE (OUTPATIENT)
Dept: OBSTETRICS AND GYNECOLOGY | Facility: CLINIC | Age: 24
End: 2024-01-26
Payer: COMMERCIAL

## 2024-01-26 NOTE — TELEPHONE ENCOUNTER
Returned pt call to give her appt date and time to see Dr Eugene. Pt accepted and voiced understanding.       ----- Message from Tess Stevens sent at 1/26/2024 12:24 PM CST -----  Regarding: self [phone]    .Type: Patient Call Back    Who called: self  What is the request in detail: Pt was seen in the ER yesterday for a miscarriage and was told to call in for an fu appt asap Pt stated that she has a fever and sharp pains in her spine     Can the clinic reply by [facility]? Call back    Would the patient rather a call back or a response via [facility]? Call back    Best call back number: .[phone]

## 2024-01-29 ENCOUNTER — OFFICE VISIT (OUTPATIENT)
Dept: OBSTETRICS AND GYNECOLOGY | Facility: CLINIC | Age: 24
End: 2024-01-29
Payer: COMMERCIAL

## 2024-01-29 VITALS — SYSTOLIC BLOOD PRESSURE: 100 MMHG | DIASTOLIC BLOOD PRESSURE: 60 MMHG | BODY MASS INDEX: 22.24 KG/M2 | WEIGHT: 142 LBS

## 2024-01-29 DIAGNOSIS — Z98.890 STATUS POST ELECTIVE ABORTION: Primary | ICD-10-CM

## 2024-01-29 PROCEDURE — 3078F DIAST BP <80 MM HG: CPT | Mod: CPTII,S$GLB,, | Performed by: OBSTETRICS & GYNECOLOGY

## 2024-01-29 PROCEDURE — 3008F BODY MASS INDEX DOCD: CPT | Mod: CPTII,S$GLB,, | Performed by: OBSTETRICS & GYNECOLOGY

## 2024-01-29 PROCEDURE — 1159F MED LIST DOCD IN RCRD: CPT | Mod: CPTII,S$GLB,, | Performed by: OBSTETRICS & GYNECOLOGY

## 2024-01-29 PROCEDURE — 1160F RVW MEDS BY RX/DR IN RCRD: CPT | Mod: CPTII,S$GLB,, | Performed by: OBSTETRICS & GYNECOLOGY

## 2024-01-29 PROCEDURE — 99214 OFFICE O/P EST MOD 30 MIN: CPT | Mod: S$GLB,,, | Performed by: OBSTETRICS & GYNECOLOGY

## 2024-01-29 PROCEDURE — 99999 PR PBB SHADOW E&M-EST. PATIENT-LVL IV: CPT | Mod: PBBFAC,,, | Performed by: OBSTETRICS & GYNECOLOGY

## 2024-01-29 PROCEDURE — 3074F SYST BP LT 130 MM HG: CPT | Mod: CPTII,S$GLB,, | Performed by: OBSTETRICS & GYNECOLOGY

## 2024-01-29 NOTE — PROGRESS NOTES
History & Physical  Gynecology      SUBJECTIVE:     Chief Complaint: Follow-up       History of Present Illness:  Ms. Noe is a 22 y/o female who [place]. She reports that she was seen in the ED for vaginal bleeding and was diagnosed with incomplete AB. She was given cytotec PO while in the ED. Pregnancy imaging: transvaginal ultrasound done and reviewed.     Review of patient's allergies indicates:  No Known Allergies    Past Medical History:   Diagnosis Date    Allergy     Asthma      History reviewed. No pertinent surgical history.  OB History          1    Para        Term                AB        Living             SAB        IAB        Ectopic        Multiple        Live Births                   Family History   Problem Relation Age of Onset    Asthma Mother     Hypertension Mother     ADD / ADHD Father     Asthma Maternal Grandmother     Cancer Maternal Grandmother         uterus     Heart disease Maternal Grandmother     Cancer Maternal Grandfather         prostates     Cancer Paternal Grandmother         uterus     Colon cancer Paternal Grandfather     Esophageal cancer Neg Hx      Social History     Tobacco Use    Smoking status: Never    Smokeless tobacco: Never   Substance Use Topics    Alcohol use: No    Drug use: No       Current Outpatient Medications   Medication Sig    acetaminophen (TYLENOL) 500 MG tablet Take 1 tablet (500 mg total) by mouth every 4 (four) hours as needed for Temperature greater than (100.5 or greater).    albuterol (PROAIR HFA) 90 mcg/actuation inhaler Inhale 2 puffs into the lungs every 4 (four) hours as needed. (Patient not taking: Reported on 2023)    benzocaine-menthoL 15-3.6 mg Lozg 1 lozenge by Mucous Membrane route every 2 (two) hours as needed (sore throat).    famotidine (PEPCID) 40 MG tablet Take 0.5 tablets (20 mg total) by mouth 2 (two) times daily. (Patient not taking: Reported on 2023)     fluticasone propionate (FLONASE) 50 mcg/actuation nasal spray 1 spray (50 mcg total) by Each Nare route once daily. (Patient not taking: Reported on 11/30/2023)    metroNIDAZOLE (METROGEL) 0.75 % (37.5mg/5 gram) vaginal gel Place 1 applicator vaginally 2 (two) times daily.    ondansetron (ZOFRAN-ODT) 4 MG TbDL Take 1 tablet (4 mg total) by mouth every 8 (eight) hours as needed (nausea).    oxyCODONE-acetaminophen (PERCOCET) 5-325 mg per tablet Take 1 tablet by mouth every 6 (six) hours as needed (severe pain).    phenoL (CHLORASEPTIC THROAT SPRAY) 1.4 % SprA by Mucous Membrane route every 2 (two) hours.    prenatal71-iron-folic acid-dha (PRENA1 ELSA) 30-1.4-200 mg CpID Take 1 tablet by mouth once daily.    triamcinolone acetonide 0.1% (KENALOG) 0.1 % cream Apply topically 2 (two) times daily. for 10 days (Patient not taking: Reported on 8/18/2022)     No current facility-administered medications for this visit.         Review of Systems:  Review of Systems   Constitutional:  Negative for chills and fever.   Eyes:  Negative for visual disturbance.   Respiratory:  Negative for cough and wheezing.    Cardiovascular:  Negative for chest pain and palpitations.   Gastrointestinal:  Negative for abdominal pain, nausea and vomiting.   Genitourinary:  Negative for dysuria, frequency, hematuria, pelvic pain, vaginal bleeding, vaginal discharge and vaginal pain.   Neurological:  Negative for headaches.   Psychiatric/Behavioral:  Negative for depression.         OBJECTIVE:     Physical Exam:  Physical Exam  Vitals and nursing note reviewed.   Constitutional:       Appearance: Normal appearance. She is well-developed.   Cardiovascular:      Rate and Rhythm: Normal rate.   Pulmonary:      Effort: Pulmonary effort is normal. No respiratory distress.   Abdominal:      General: There is no distension.      Palpations: Abdomen is soft.      Tenderness: There is no abdominal tenderness.   Genitourinary:     Exam position: Supine.    Skin:     General: Skin is warm and dry.   Neurological:      Mental Status: She is oriented to person, place, and time.         ASSESSMENT:       ICD-10-CM ICD-9-CM    1. Status post elective   Z98.890 V45.89 HCG, QUANTITATIVE, PREGNANCY          Plan:   Jesica was seen today for follow-up.    Diagnoses and all orders for this visit:    Status post elective   -     HCG, QUANTITATIVE, PREGNANCY; Future  - Patient with  in another state  - Reports that she was seen in the ED for heavy bleeding.   - Cytotec 800 mcg was given PO. Complained of cramping and pain but better now      Orders Placed This Encounter   Procedures    HCG, QUANTITATIVE, PREGNANCY       Follow up if symptoms worsen or fail to improve.        Orders Placed This Encounter   Procedures    HCG, QUANTITATIVE, PREGNANCY       Follow up if symptoms worsen or fail to improve.        Counseling time: 30 minutes    Jayla Minor

## 2024-02-07 ENCOUNTER — TELEPHONE (OUTPATIENT)
Dept: OBSTETRICS AND GYNECOLOGY | Facility: CLINIC | Age: 24
End: 2024-02-07
Payer: COMMERCIAL

## 2024-02-07 ENCOUNTER — NURSE TRIAGE (OUTPATIENT)
Dept: ADMINISTRATIVE | Facility: CLINIC | Age: 24
End: 2024-02-07
Payer: COMMERCIAL

## 2024-02-07 NOTE — TELEPHONE ENCOUNTER
Spoke with patient who states she just got off the Airplane in [place].  Patient states she had a miscarriage on .  Patient states she was given 4 pills in the ED (misoprostol per chart).  Patient states she is now having severe vaginal bleeding and passing large blood clots.  Patient states that this happened before.  Patient also states she is having symptoms of shock (cold, pale, clammy, weakness, and rapid pulse).  Advised EMS-911 per protocol.  Patient verbalized understanding.  She is in [place] at this time.  Patient asked if Dr. Minor could call her.  Message sent to office.   Reason for Disposition   Shock suspected (e.g., cold/pale/clammy skin, too weak to stand, low BP, rapid pulse)    Protocols used:  - Threatened Miscarriage Follow-up Call-[facility]

## 2024-02-07 NOTE — TELEPHONE ENCOUNTER
Pt call was returned. Pt will come into the office on 02/15/24 at 145 pm to see provider for bleeding.      .----- Message from Aileen Wills sent at 2/7/2024  2:23 PM CST -----  Regarding: patient call back  Type: Patient Call Back    Who called: Self     What is the request in detail: Asked for a call back from the nurse. She's out of town in ED and needs to speak with her.     Can the clinic reply by [facility]? no    Would the patient rather a call back or a response via [facility]? Call     Best call back number: .[phone]

## 2024-02-15 ENCOUNTER — TELEPHONE (OUTPATIENT)
Dept: OBSTETRICS AND GYNECOLOGY | Facility: CLINIC | Age: 24
End: 2024-02-15
Payer: COMMERCIAL

## 2024-02-15 NOTE — TELEPHONE ENCOUNTER
----- Message from Claudette Gudino sent at 2/15/2024  2:24 PM CST -----  .Type: Patient Call Back    Who called:Self     What is the request in detail: Stated she overslept and missed her appointment. Would like to know can she get rescheduled before April     Can the clinic reply by [facility]? No     Would the patient rather a call back or a response via [facility]? Call Back     Best call back number: [phone]    Additional Information:

## 2024-02-19 ENCOUNTER — TELEPHONE (OUTPATIENT)
Dept: OBSTETRICS AND GYNECOLOGY | Facility: CLINIC | Age: 24
End: 2024-02-19
Payer: COMMERCIAL

## 2024-02-19 NOTE — TELEPHONE ENCOUNTER
Spoke with pt and decided to cancel her appointment due to the cramping and bleeding has stopped. Informed pt that if she needs to reschedule she can via MyChart or give us a call. VU.    ----- Message from Elvis Kim sent at 2/19/2024 12:39 PM CST -----  Regarding: Jesica  Type:Patient Callback     Who called: Jesica     What is the request in detail: Pt stated that she would like for the nurse to give a call. She wants to know if she still needs to come to her appointment and she is having some complications getting there but she is no longer bleeding and cramping. Please reach out to the patient.     Can the clinic reply by [facility]? Yes     Would the patient rather a call back or a response via [facility]? Callback     Best call back number: .[phone]      Additional Information:

## 2024-04-03 ENCOUNTER — NURSE TRIAGE (OUTPATIENT)
Dept: ADMINISTRATIVE | Facility: CLINIC | Age: 24
End: 2024-04-03
Payer: COMMERCIAL

## 2024-04-03 NOTE — TELEPHONE ENCOUNTER
Pt calling with c/o vaginal bleeding and mood swings since she had an  in . Pt said that she has been crying as well and mom is saying its from hormones. Pt triaged and care advice to be seen in office today and will route a message to GYN to see if anything available. If not pt told to go to UC                  Reason for Disposition   Skin bruises or nosebleed and not caused by an injury    Additional Information   Negative: SEVERE vaginal bleeding (e.g., continuous red blood from vagina, or large blood clots) and very weak (can't stand)   Negative: Passed out (i.e., fainted, collapsed and was not responding)   Negative: Difficult to awaken or acting confused (e.g., disoriented, slurred speech)   Negative: Shock suspected (e.g., cold/pale/clammy skin, too weak to stand, low BP, rapid pulse)   Negative: Sounds like a life-threatening emergency to the triager   Negative: SEVERE abdominal pain (e.g., excruciating)   Negative: SEVERE dizziness (e.g., unable to stand, requires support to walk, feels like passing out now)   Negative: SEVERE vaginal bleeding (e.g., soaking 2 pads or tampons per hour and present 2 or more hours; 1 menstrual cup every 2 hours)   Negative: Patient sounds very sick or weak to the triager   Negative: MODERATE vaginal bleeding (i.e., soaking pad or tampon per hour and present > 6 hours; 1 menstrual cup every 6 hours)   Negative: Constant abdominal pain lasting > 2 hours   Negative: Pale skin (pallor) of new-onset or worsening   Negative: Taking Coumadin (warfarin) or other strong blood thinner, or known bleeding disorder (e.g., thrombocytopenia)    Protocols used: Vaginal Bleeding - [id number]     by Adali Villagran - STEPHEN/operating room

## 2024-04-24 ENCOUNTER — OFFICE VISIT (OUTPATIENT)
Dept: FAMILY MEDICINE | Facility: CLINIC | Age: 24
End: 2024-04-24
Payer: COMMERCIAL

## 2024-04-24 VITALS
HEIGHT: 67 IN | SYSTOLIC BLOOD PRESSURE: 100 MMHG | BODY MASS INDEX: 22.63 KG/M2 | OXYGEN SATURATION: 98 % | HEART RATE: 65 BPM | WEIGHT: 144.19 LBS | TEMPERATURE: 98 F | DIASTOLIC BLOOD PRESSURE: 72 MMHG

## 2024-04-24 DIAGNOSIS — F41.9 ANXIETY: Primary | ICD-10-CM

## 2024-04-24 DIAGNOSIS — F32.0 CURRENT MILD EPISODE OF MAJOR DEPRESSIVE DISORDER WITHOUT PRIOR EPISODE: ICD-10-CM

## 2024-04-24 PROCEDURE — 99999 PR PBB SHADOW E&M-EST. PATIENT-LVL V: CPT | Mod: PBBFAC,,, | Performed by: INTERNAL MEDICINE

## 2024-04-24 PROCEDURE — 1160F RVW MEDS BY RX/DR IN RCRD: CPT | Mod: CPTII,S$GLB,, | Performed by: INTERNAL MEDICINE

## 2024-04-24 PROCEDURE — 3078F DIAST BP <80 MM HG: CPT | Mod: CPTII,S$GLB,, | Performed by: INTERNAL MEDICINE

## 2024-04-24 PROCEDURE — 1159F MED LIST DOCD IN RCRD: CPT | Mod: CPTII,S$GLB,, | Performed by: INTERNAL MEDICINE

## 2024-04-24 PROCEDURE — 3008F BODY MASS INDEX DOCD: CPT | Mod: CPTII,S$GLB,, | Performed by: INTERNAL MEDICINE

## 2024-04-24 PROCEDURE — 99214 OFFICE O/P EST MOD 30 MIN: CPT | Mod: S$GLB,,, | Performed by: INTERNAL MEDICINE

## 2024-04-24 PROCEDURE — 3074F SYST BP LT 130 MM HG: CPT | Mod: CPTII,S$GLB,, | Performed by: INTERNAL MEDICINE

## 2024-04-24 RX ORDER — ESCITALOPRAM OXALATE 10 MG/1
10 TABLET ORAL DAILY
Qty: 30 TABLET | Refills: 0 | Status: SHIPPED | OUTPATIENT
Start: 2024-04-24 | End: 2024-05-22

## 2024-04-24 NOTE — PROGRESS NOTES
SUBJECTIVE     Chief Complaint   Patient presents with    Anxiety       HPI  Jesica Noe is a 23 y.o. female with multiple medical diagnoses as listed in the medical history and problem list that presents for evaluation of anxiety and depression since her  on 2024. She did not want to have an , but she felt like it was her only option. Pt now regrets her decision. She feels disconnected like she's in another world. Pt has been irrational and saying things to people that she doesn't mean. Pt is angry and sad about everything. Denies any SI/HI.    PAST MEDICAL HISTORY:  Past Medical History:   Diagnosis Date    Allergy     Asthma        PAST SURGICAL HISTORY:  No past surgical history on file.    SOCIAL HISTORY:  Social History     Socioeconomic History    Marital status: Single   Tobacco Use    Smoking status: Never    Smokeless tobacco: Never   Substance and Sexual Activity    Alcohol use: No    Drug use: No    Sexual activity: Yes     Partners: Male     Birth control/protection: Injection     Social Determinants of Health     Financial Resource Strain: High Risk (2024)    Overall Financial Resource Strain (CARDIA)     Difficulty of Paying Living Expenses: Very hard   Food Insecurity: Food Insecurity Present (2024)    Hunger Vital Sign     Worried About Running Out of Food in the Last Year: Often true     Ran Out of Food in the Last Year: Often true   Transportation Needs: Unmet Transportation Needs (2024)    PRAPARE - Transportation     Lack of Transportation (Medical): Yes     Lack of Transportation (Non-Medical): Yes   Physical Activity: Sufficiently Active (2024)    Exercise Vital Sign     Days of Exercise per Week: 2 days     Minutes of Exercise per Session: 90 min   Stress: Stress Concern Present (2024)    [place] [place] [employer] - Occupational Stress Questionnaire     Feeling of Stress : Very much   Social Connections: Unknown (2024)     Social Connection and Isolation Panel [NHANES]     Frequency of Communication with Friends and Family: More than three times a week     Frequency of Social Gatherings with Friends and Family: Once a week     Active Member of Clubs or Organizations: Patient declined     Attends Club or Organization Meetings: Never     Marital Status: Never    Housing Stability: High Risk (4/24/2024)    Housing Stability Vital Sign     Unable to Pay for Housing in the Last Year: Yes       FAMILY HISTORY:  Family History   Problem Relation Name Age of Onset    Asthma Mother      Hypertension Mother      ADD / ADHD Father      Asthma Maternal Grandmother      Cancer Maternal Grandmother          uterus     Heart disease Maternal Grandmother      Cancer Maternal Grandfather          prostates     Cancer Paternal Grandmother          uterus     Colon cancer Paternal Grandfather      Esophageal cancer Neg Hx         ALLERGIES AND MEDICATIONS: updated and reviewed.  Review of patient's allergies indicates:  No Known Allergies  Current Outpatient Medications   Medication Sig Dispense Refill    acetaminophen (TYLENOL) 500 MG tablet Take 1 tablet (500 mg total) by mouth every 4 (four) hours as needed for Temperature greater than (100.5 or greater). 30 tablet 0    albuterol (PROAIR HFA) 90 mcg/actuation inhaler Inhale 2 puffs into the lungs every 4 (four) hours as needed. 1 Inhaler 12    benzocaine-menthoL 15-3.6 mg Lozg 1 lozenge by Mucous Membrane route every 2 (two) hours as needed (sore throat). 16 lozenge 0    fluticasone propionate (FLONASE) 50 mcg/actuation nasal spray 1 spray (50 mcg total) by Each Nare route once daily. 16 g 5    metroNIDAZOLE (METROGEL) 0.75 % (37.5mg/5 gram) vaginal gel Place 1 applicator vaginally 2 (two) times daily. 70 g 0    ondansetron (ZOFRAN-ODT) 4 MG TbDL Take 1 tablet (4 mg total) by mouth every 8 (eight) hours as needed (nausea). 12 tablet 0    oxyCODONE-acetaminophen (PERCOCET) 5-325 mg per tablet  "Take 1 tablet by mouth every 6 (six) hours as needed (severe pain). 12 tablet 0    phenoL (CHLORASEPTIC THROAT SPRAY) 1.4 % SprA by Mucous Membrane route every 2 (two) hours. 177 mL 0    EScitalopram oxalate (LEXAPRO) 10 MG tablet Take 1 tablet (10 mg total) by mouth once daily. 30 tablet 0    famotidine (PEPCID) 40 MG tablet Take 0.5 tablets (20 mg total) by mouth 2 (two) times daily. (Patient not taking: Reported on 11/30/2023) 30 tablet 11    prenatal71-iron-folic acid-dha (PRENA1 ELSA) 30-1.4-200 mg CpID Take 1 tablet by mouth once daily. (Patient not taking: Reported on 4/24/2024) 180 each 1    triamcinolone acetonide 0.1% (KENALOG) 0.1 % cream Apply topically 2 (two) times daily. for 10 days (Patient not taking: Reported on 8/18/2022) 15 g 1     No current facility-administered medications for this visit.       ROS  Review of Systems   Constitutional:  Negative for chills and fever.   HENT:  Negative for hearing loss and sore throat.    Eyes:  Negative for visual disturbance.   Respiratory:  Negative for cough and shortness of breath.    Cardiovascular:  Negative for chest pain, palpitations and leg swelling.   Gastrointestinal:  Negative for abdominal pain, constipation, diarrhea, nausea and vomiting.   Genitourinary:  Negative for dysuria, frequency and urgency.   Musculoskeletal:  Negative for arthralgias, joint swelling and myalgias.   Skin:  Negative for rash and wound.   Neurological:  Negative for headaches.   Psychiatric/Behavioral:  Positive for agitation and dysphoric mood. Negative for confusion. The patient is nervous/anxious.          OBJECTIVE     Physical Exam  Vitals:    04/24/24 0850   BP: 100/72   Pulse: 65   Temp: 97.6 °F (36.4 °C)    Body mass index is 22.58 kg/m².  Weight: 65.4 kg (144 lb 2.9 oz)   Height: 5' 7" (170.2 cm)     Physical Exam  Constitutional:       General: She is not in acute distress.     Appearance: She is well-developed.   HENT:      Head: Normocephalic and atraumatic. "      Right Ear: External ear normal.      Left Ear: External ear normal.      Nose: Nose normal.   Eyes:      General: No scleral icterus.        Right eye: No discharge.         Left eye: No discharge.      Conjunctiva/sclera: Conjunctivae normal.   Neck:      Vascular: No JVD.      Trachea: No tracheal deviation.   Pulmonary:      Effort: Pulmonary effort is normal. No respiratory distress.   Musculoskeletal:         General: No tenderness or deformity. Normal range of motion.      Cervical back: Normal range of motion and neck supple.   Skin:     General: Skin is warm and dry.      Findings: No erythema or rash.   Neurological:      Mental Status: She is alert and oriented to person, place, and time.      Motor: No abnormal muscle tone.      Coordination: Coordination normal.   Psychiatric:         Behavior: Behavior normal.         Thought Content: Thought content normal.         Judgment: Judgment normal.           Health Maintenance         Date Due Completion Date    Lipid Panel Never done ---    HPV Vaccines (2 - 3-dose series) 11/05/2018 10/8/2018    Influenza Vaccine (1) 09/01/2023 10/8/2018    COVID-19 Vaccine (1 - 2023-24 season) Never done ---    Chlamydia Screening 12/19/2024 12/19/2023    Pap Smear 01/21/2025 1/21/2022    TETANUS VACCINE 11/18/2031 11/18/2021              ASSESSMENT     23 y.o. female with     1. Anxiety    2. Current mild episode of major depressive disorder without prior episode        PLAN:     1. Anxiety  - Start trial Lexapro and pt to start counseling  - EScitalopram oxalate (LEXAPRO) 10 MG tablet; Take 1 tablet (10 mg total) by mouth once daily.  Dispense: 30 tablet; Refill: 0  - Ambulatory referral/consult to Primary Care Behavioral Health (Non-Opioids); Future    2. Current mild episode of major depressive disorder without prior episode  - As above  - EScitalopram oxalate (LEXAPRO) 10 MG tablet; Take 1 tablet (10 mg total) by mouth once daily.  Dispense: 30 tablet; Refill:  0  - Ambulatory referral/consult to Primary Care Behavioral Health (Non-Opioids); Future        RTC in 4 weeks for repeat assessment of current treatment plan       Lisy Shipley MD  04/24/2024 8:59 AM        No follow-ups on file.

## 2024-04-25 ENCOUNTER — TELEPHONE (OUTPATIENT)
Dept: BEHAVIORAL HEALTH | Facility: CLINIC | Age: 24
End: 2024-04-25
Payer: COMMERCIAL

## 2024-04-25 ENCOUNTER — PATIENT MESSAGE (OUTPATIENT)
Dept: BEHAVIORAL HEALTH | Facility: CLINIC | Age: 24
End: 2024-04-25
Payer: COMMERCIAL

## 2024-04-25 NOTE — PROGRESS NOTES
Behavioral Health Community Health Worker  Initial Assessment  Completed by:  Benigno Townsend    Date:  4/25/2024    Patient Enrollment in Behavioral Health Program:  Patient verbalized understanding of Behavioral Health Integration services to include:  Patient understands that CHW, LCSW, PharmD and consulting Psychiatrist are members of the care team working collaboratively with his/her primary care provider: Yes  Patient understands that activation of their [facility] patient portal account is required for accessing the full scope of team services: Yes  Patient understands that some counseling sessions may occur via video: Yes  Clinic visits with the psychiatrist may be subject to a co-pay based on your insurance: Yes  Patient consents to enroll in BHI program: Yes    Assessments     Single Item Health Literacy Scale:  How often do you need to have someone help you read instructions, pamphlets or other written material from your doctor or pharmacy?: Never    Promis 10:  Promis 10 Responses  In general, would you say your health is: Good  In general, would you say your quality of life is: Fair  In general, how would you rate your physical health?: Good  In general, how would you rate your mental health, including your mood and your ability to think?: Poor  In general, how would you rate your satisfaction with your social activities and relationships?: Fair  In general, please rate how well you carry out your usual social activities and roles. (This includes activities at home, at work and in your community, and responsibilities as a parent, child, spouse, employee, friend, etc.): Good  To what extent are you able to carry out your everyday physical activities such as walking, climbing stairs, carrying groceries, or moving a chair? : Completely  How often have you been bothered by emotional problems such as feeling anxious, depressed or irritable?: Often  In the past 7 days, how would you rate your fatigue on  average?: Severe  In the past 7 days, on a scale of 0 to 10 (where 0 is no pain and 10 is the worst pain imaginable) how would you rate your pain on average?: 2  Global Physical Health: 14  Global Mental health Score: 9    Depression PHQ:      4/25/2024     2:48 PM 4/24/2024     8:51 AM 1/23/2023     1:42 PM   PHQ-9 Depression Patient Health Questionnaire   Over the last two weeks how often have you been bothered by little interest or pleasure in doing things 3 3 0   Over the last two weeks how often have you been bothered by feeling down, depressed or hopeless 3 3 0   Over the last two weeks how often have you been bothered by trouble falling or staying asleep, or sleeping too much 3     Over the last two weeks how often have you been bothered by feeling tired or having little energy 3     Over the last two weeks how often have you been bothered by a poor appetite or overeating 3     Over the last two weeks how often have you been bothered by feeling bad about yourself - or that you are a failure or have let yourself or your family down 3     Over the last two weeks how often have you been bothered by trouble concentrating on things, such as reading the newspaper or watching television 3     Over the last two weeks how often have you been bothered by moving or speaking so slowly that other people could have noticed. 3     Over the last two weeks how often have you been bothered by thoughts that you would be better off dead, or of hurting yourself 2     If you checked off any problems, how difficult have these problems made it for you to do your work, take care of things at home or get along with other people? Extremely difficult     PHQ-9 Score 26            Generalized Anxiety Disorder 7-Item Scale:      4/25/2024     2:52 PM   GAD7   1. Feeling nervous, anxious, or on edge? 3   2. Not being able to stop or control worrying? 3   3. Worrying too much about different things? 3   4. Trouble relaxing? 3   5. Being so  restless that it is hard to sit still? 2   6. Becoming easily annoyed or irritable? 3   7. Feeling afraid as if something awful might happen? 2   8. If you checked off any problems, how difficult have these problems made it for you to do your work, take care of things at home, or get along with other people? 3   MYRNA-7 Score 19       History     Social History     Socioeconomic History    Marital status: Single   Tobacco Use    Smoking status: Never    Smokeless tobacco: Never   Substance and Sexual Activity    Alcohol use: No    Drug use: No    Sexual activity: Yes     Partners: Male     Birth control/protection: Injection     Social Determinants of Health     Financial Resource Strain: High Risk (4/24/2024)    Overall Financial Resource Strain (CARDIA)     Difficulty of Paying Living Expenses: Very hard   Food Insecurity: Food Insecurity Present (4/24/2024)    Hunger Vital Sign     Worried About Running Out of Food in the Last Year: Often true     Ran Out of Food in the Last Year: Often true   Transportation Needs: Unmet Transportation Needs (4/24/2024)    PRAPARE - Transportation     Lack of Transportation (Medical): Yes     Lack of Transportation (Non-Medical): Yes   Physical Activity: Sufficiently Active (4/24/2024)    Exercise Vital Sign     Days of Exercise per Week: 2 days     Minutes of Exercise per Session: 90 min   Stress: Stress Concern Present (4/24/2024)    [employer] [place] [employer] - Occupational Stress Questionnaire     Feeling of Stress : Very much   Social Connections: Unknown (4/24/2024)    Social Connection and Isolation Panel [NHANES]     Frequency of Communication with Friends and Family: More than three times a week     Frequency of Social Gatherings with Friends and Family: Once a week     Active Member of Clubs or Organizations: Patient declined     Attends Club or Organization Meetings: Never     Marital Status: Never    Housing Stability: High Risk (4/24/2024)     "Housing Stability Vital Sign     Unable to Pay for Housing in the Last Year: Yes       Call Summary   Patient was referred to the BHI (Non-opioid) program by Primary Care Provider, Dr. Shipley CHW contacted Jesica Noe who reports depression that limits [his/her] activities of daily living (ADLs).   Patient scored "26" on the PHQ9 and "19" on the MYRNA 7. Based on these scores patient is eligible for the Behavioral health Integration (Non-opioid) Program. CHW completed the intake and scheduled an appointment for patient with Mariposa Moran LCSW, on 5/6/24.  Patient given emergent information if need [phone] crisis line number etc,    "

## 2024-05-07 ENCOUNTER — OFFICE VISIT (OUTPATIENT)
Dept: BEHAVIORAL HEALTH | Facility: CLINIC | Age: 24
End: 2024-05-07
Payer: COMMERCIAL

## 2024-05-07 DIAGNOSIS — F33.1 MDD (MAJOR DEPRESSIVE DISORDER), RECURRENT EPISODE, MODERATE: ICD-10-CM

## 2024-05-07 DIAGNOSIS — F41.9 ANXIETY: Primary | ICD-10-CM

## 2024-05-07 PROCEDURE — 90791 PSYCH DIAGNOSTIC EVALUATION: CPT | Mod: 95,,,
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
## 2024-05-20 ENCOUNTER — PATIENT MESSAGE (OUTPATIENT)
Dept: BEHAVIORAL HEALTH | Facility: CLINIC | Age: 24
End: 2024-05-20
Payer: COMMERCIAL

## 2024-05-21 ENCOUNTER — OFFICE VISIT (OUTPATIENT)
Dept: BEHAVIORAL HEALTH | Facility: CLINIC | Age: 24
End: 2024-05-21
Payer: COMMERCIAL

## 2024-05-21 DIAGNOSIS — F33.1 MDD (MAJOR DEPRESSIVE DISORDER), RECURRENT EPISODE, MODERATE: ICD-10-CM

## 2024-05-21 DIAGNOSIS — F41.9 ANXIETY: Primary | ICD-10-CM

## 2024-05-21 PROCEDURE — 90832 PSYTX W PT 30 MINUTES: CPT | Mod: 95,,,

## 2024-05-21 NOTE — PROGRESS NOTES
"Individual Psychotherapy (ADINA/LCSW/PhD)  Jesica Noe,  2024    Site:  [facility]         Therapeutic Intervention: Met with patient for individual psychotherapy.    Chief complaint/reason for encounter: depression and anxiety       Interval history and content of current session:   Pt was last seen on 24 for an initial evaluation. Pt presented with a pleasant affect and mood. Pt reported that things had been going well and that she did not have much anxiety. Pt reported that she felt that her medication, Lexapro, was helping. Pt reported that she was starting to identify her triggers to her anxiety and removed herself from the triggers if possible. CBT skills for the management of anxiety was discussed. Pt shared that she went on a cruise. Pt reported that she was able to sleep "a lot" on the cruise. Pt reported that the  caused her to do more reflecting. Pt reported that she decided that she wanted to to go back to school. Pt reported that she felt that she cared more about her future now. Pt shared that she went to school to become an . Pt stated that she had a goal of wanting to open a [employer] and wanted to go back to school to become an RN. SW actively listened and provided support and encouragement.     Treatment plan:  Target symptoms: depression, anxiety   Why chosen therapy is appropriate versus another modality: relevant to diagnosis  Outcome monitoring methods: self-report, observation  Therapeutic intervention type: supportive psychotherapy    Risk parameters:  Patient reports no suicidal ideation  Patient reports no homicidal ideation  Patient reports no self-injurious behavior  Patient reports no violent behavior    Verbal deficits: None    Patient's response to intervention:  The patient's response to intervention is accepting.    Progress toward goals and other mental status changes:  The patient's progress toward goals is fair .    Patient advised to call [phone] " or present the the nearest ED if they experience suicidal or homicidal ideation, plan or intent.           No data to display                    4/25/2024     2:52 PM   GAD7   1. Feeling nervous, anxious, or on edge? 3   2. Not being able to stop or control worrying? 3   3. Worrying too much about different things? 3   4. Trouble relaxing? 3   5. Being so restless that it is hard to sit still? 2   6. Becoming easily annoyed or irritable? 3   7. Feeling afraid as if something awful might happen? 2   8. If you checked off any problems, how difficult have these problems made it for you to do your work, take care of things at home, or get along with other people? 3   MYRNA-7 Score 19       Diagnosis:   Unspecified Anxiety Disorder (F41.9); Major Depressive Disorder, Recurrent, Moderate (F33.1)    Plan: Pt plans to continue individual psychotherapy    Return to clinic: 2 weeks    Length of Service (minutes):  33 minutes

## 2024-05-22 DIAGNOSIS — F32.0 CURRENT MILD EPISODE OF MAJOR DEPRESSIVE DISORDER WITHOUT PRIOR EPISODE: ICD-10-CM

## 2024-05-22 DIAGNOSIS — F41.9 ANXIETY: ICD-10-CM

## 2024-05-22 RX ORDER — ESCITALOPRAM OXALATE 10 MG/1
10 TABLET ORAL
Qty: 90 TABLET | Refills: 1 | Status: SHIPPED | OUTPATIENT
Start: 2024-05-22

## 2024-05-22 NOTE — TELEPHONE ENCOUNTER
No care due was identified.  Health [facility] Embedded Care Due Messages. Reference number: [id number].   5/22/2024 10:11:35 AM CDT

## 2024-05-26 ENCOUNTER — HOSPITAL ENCOUNTER (EMERGENCY)
Facility: HOSPITAL | Age: 24
Discharge: HOME | End: 2024-05-26
Attending: EMERGENCY MEDICINE
Payer: COMMERCIAL

## 2024-05-26 VITALS
DIASTOLIC BLOOD PRESSURE: 70 MMHG | BODY MASS INDEX: 23.54 KG/M2 | SYSTOLIC BLOOD PRESSURE: 111 MMHG | HEIGHT: 67 IN | OXYGEN SATURATION: 98 % | RESPIRATION RATE: 16 BRPM | TEMPERATURE: 98 F | HEART RATE: 71 BPM | WEIGHT: 150 LBS

## 2024-05-26 DIAGNOSIS — L50.9 HIVES: ICD-10-CM

## 2024-05-26 DIAGNOSIS — T78.40XA ALLERGIC REACTION, INITIAL ENCOUNTER: Primary | ICD-10-CM

## 2024-05-26 LAB
B-HCG UR QL: NEGATIVE
CTP QC/QA: YES

## 2024-05-26 PROCEDURE — 25000003 PHARM REV CODE 250: Performed by: PHYSICIAN ASSISTANT

## 2024-05-26 PROCEDURE — 81025 URINE PREGNANCY TEST: CPT | Performed by: NURSE PRACTITIONER

## 2024-05-26 PROCEDURE — 96372 THER/PROPH/DIAG INJ SC/IM: CPT | Performed by: PHYSICIAN ASSISTANT

## 2024-05-26 PROCEDURE — 63600175 PHARM REV CODE 636 W HCPCS: Performed by: PHYSICIAN ASSISTANT

## 2024-05-26 PROCEDURE — 99284 EMERGENCY DEPT VISIT MOD MDM: CPT | Mod: 25

## 2024-05-26 RX ORDER — ONDANSETRON 4 MG/1
4 TABLET, ORALLY DISINTEGRATING ORAL
Status: COMPLETED | OUTPATIENT
Start: 2024-05-26 | End: 2024-05-26

## 2024-05-26 RX ORDER — FAMOTIDINE 20 MG/1
20 TABLET, FILM COATED ORAL
Status: COMPLETED | OUTPATIENT
Start: 2024-05-26 | End: 2024-05-26

## 2024-05-26 RX ORDER — DIPHENHYDRAMINE HCL 50 MG
50 CAPSULE ORAL EVERY 6 HOURS PRN
COMMUNITY
End: 2024-05-26

## 2024-05-26 RX ORDER — FAMOTIDINE 20 MG/1
20 TABLET, FILM COATED ORAL 2 TIMES DAILY
Qty: 10 TABLET | Refills: 0 | Status: SHIPPED | OUTPATIENT
Start: 2024-05-26 | End: 2024-05-31

## 2024-05-26 RX ORDER — DEXAMETHASONE SODIUM PHOSPHATE 4 MG/ML
6 INJECTION, SOLUTION INTRA-ARTICULAR; INTRALESIONAL; INTRAMUSCULAR; INTRAVENOUS; SOFT TISSUE
Status: COMPLETED | OUTPATIENT
Start: 2024-05-26 | End: 2024-05-26

## 2024-05-26 RX ORDER — DIPHENHYDRAMINE HCL 25 MG
50 CAPSULE ORAL EVERY 6 HOURS PRN
Qty: 30 CAPSULE | Refills: 0 | Status: SHIPPED | OUTPATIENT
Start: 2024-05-26 | End: 2024-05-31

## 2024-05-26 RX ADMIN — DEXAMETHASONE SODIUM PHOSPHATE 6 MG: 4 INJECTION INTRA-ARTICULAR; INTRALESIONAL; INTRAMUSCULAR; INTRAVENOUS; SOFT TISSUE at 07:05

## 2024-05-26 RX ADMIN — ONDANSETRON 4 MG: 4 TABLET, ORALLY DISINTEGRATING ORAL at 07:05

## 2024-05-26 RX ADMIN — FAMOTIDINE 20 MG: 20 TABLET ORAL at 07:05

## 2024-05-27 NOTE — ED PROVIDER NOTES
Encounter Date: 5/26/2024       History     Chief Complaint   Patient presents with    Allergic Reaction     Pt presents to the ED today for allergic reaction. Pt denies known allergies, pt reports eating crawfish yesterday and waking up fine. Pt noticed bumps around face and body about 30 minutes PTA. Pt reports taking benadryl with some relief. Pt reports sore throat at this time with intermittent coughing and itching. Oxygen saturation 98% on RA, airway patent, respirations normal and unlabored, appears in no acute distress at triage.      CC:Allergic Reaction    HPI:   22 y/o female with PMHx of asthma presents complaining of an itchy rash on the jaw, neck, lower legs, and medial bilateral thighs as well as feeling like her throat is tightening that began 3 hours ago while eating crawfish. Pt states she has eaten crawfish many times and never had this reaction. Pt denies using any new drugs, detergeant, soaps, lotions, pets. Pt states she has had allergic skin reactions in the past for which she does not know the cause. In the past the reactions have resolved with benadryl. Pt took 2 doses of adult benadryl at 5:25pm which significantly helped resolve the rash and her throat irritation. Nothing is making the rash worse. Pt endorses some nausea and vomiting X1, mild SOB. Pt denies fever, CP, diarrhea.     The history is provided by the patient.     Review of patient's allergies indicates:  No Known Allergies  Past Medical History:   Diagnosis Date    Allergy     Asthma      History reviewed. No pertinent surgical history.  Family History   Problem Relation Name Age of Onset    Asthma Mother      Hypertension Mother      ADD / ADHD Father      Asthma Maternal Grandmother      Cancer Maternal Grandmother          uterus     Heart disease Maternal Grandmother      Cancer Maternal Grandfather          prostates     Cancer Paternal Grandmother          uterus     Colon cancer Paternal Grandfather      Esophageal  cancer Neg Hx       Social History     Tobacco Use    Smoking status: Never    Smokeless tobacco: Never   Substance Use Topics    Alcohol use: No    Drug use: No     Review of Systems   Constitutional:  Negative for chills and fever.   HENT:  Positive for sore throat and voice change. Negative for congestion, ear pain, nosebleeds, rhinorrhea and trouble swallowing.    Eyes:  Negative for pain, redness, itching and visual disturbance.   Respiratory:  Positive for cough and chest tightness. Negative for shortness of breath, wheezing and stridor.    Cardiovascular:  Negative for chest pain and leg swelling.   Gastrointestinal:  Positive for abdominal pain, nausea and vomiting. Negative for constipation and diarrhea.   Genitourinary:  Negative for decreased urine volume, dysuria, frequency, hematuria and urgency.   Musculoskeletal:  Negative for back pain, gait problem and neck pain.   Skin:  Positive for rash. Negative for wound.   Neurological:  Negative for dizziness, speech difficulty, weakness, light-headedness, numbness and headaches.   Hematological:  Does not bruise/bleed easily.   Psychiatric/Behavioral:  Negative for confusion.        Physical Exam     Initial Vitals [05/26/24 1838]   BP Pulse Resp Temp SpO2   (!) 135/91 101 16 98.6 °F (37 °C) 99 %      MAP       --         Physical Exam    Nursing note and vitals reviewed.  Constitutional: She appears well-developed and well-nourished. She is not diaphoretic. No distress.   HENT:   Head: Normocephalic and atraumatic.   Mouth/Throat: Uvula is midline. No uvula swelling. No oropharyngeal exudate.   Pt able to speak in full sentences     Eyes: Conjunctivae and EOM are normal. Pupils are equal, round, and reactive to light. Right eye exhibits no discharge. Left eye exhibits no discharge.   Neck: Neck supple. No tracheal deviation present.   Normal range of motion.  Cardiovascular:  Normal rate, regular rhythm and normal heart sounds.     Exam reveals no gallop  and no friction rub.       No murmur heard.  Pulmonary/Chest: Breath sounds normal. No respiratory distress. She has no wheezes. She has no rhonchi. She has no rales. She exhibits no tenderness.   Abdominal: Abdomen is soft. Bowel sounds are normal. She exhibits no distension. There is no abdominal tenderness.   No right CVA tenderness.  No left CVA tenderness. There is no rebound, no guarding, no tenderness at McBurney's point and negative Ferrara's sign. negative Rovsing's sign  Musculoskeletal:         General: Normal range of motion.      Cervical back: Normal range of motion and neck supple.     Lymphadenopathy:     She has no cervical adenopathy.   Skin: Rash noted. Rash is maculopapular.              ED Course   Procedures  Labs Reviewed   POCT URINE PREGNANCY          Imaging Results    None          Medications   dexAMETHasone injection 6 mg (6 mg Intramuscular Given 5/26/24 1954)   famotidine tablet 20 mg (20 mg Oral Given 5/26/24 1955)   ondansetron disintegrating tablet 4 mg (4 mg Oral Given 5/26/24 1955)     Medical Decision Making  23-year-old female presenting for evaluation of allergic reaction.  Developed hives after eating crawfish.  Reports that she was feeling short of breath and having sensation of swelling in her throat.  She attempted treat with Benadryl prior to arrival.  UPT negative.  Decadron IM given in the ED.  Heart lung exam unremarkable.  Not hypotensive or tachycardic.  Not respiratory distress.  Considered but low suspicion for anaphylaxis at this time.  There is no oropharyngeal swelling to indicate airway compromise. Feeling much better. Tolerating PO  Considered but doubt anaphylaxis.  Will have patient follow up with primary care in 2 days return ER for worsening or as needed.    Risk  OTC drugs.  Prescription drug management.                                      Clinical Impression:  Final diagnoses:  [T78.40XA] Allergic reaction, initial encounter (Primary)  [L50.9] Hives           ED Disposition Condition    Discharge Stable          ED Prescriptions       Medication Sig Dispense Start Date End Date Auth. Provider    famotidine (PEPCID) 20 MG tablet Take 1 tablet (20 mg total) by mouth 2 (two) times daily. for 5 days 10 tablet 5/26/2024 5/31/2024 Lima Blum PA-C    diphenhydrAMINE (BENADRYL) 25 mg capsule Take 2 capsules (50 mg total) by mouth every 6 (six) hours as needed. 30 capsule 5/26/2024 5/31/2024 Lima Blum PA-C          Follow-up Information       Follow up With Specialties Details Why Contact Info    Shannon Robin MD Family Medicine Schedule an appointment as soon as possible for a visit in 2 days for follow up [address]  [place] [address]  [phone]      [facility] Emergency Medicine Go to  As needed, If symptoms worsen [address] [place] [address] [facility] [place] [id number] [phone]             Lima Blum PA-C  05/27/24 0966

## 2024-05-27 NOTE — DISCHARGE INSTRUCTIONS

## 2024-05-27 NOTE — ED TRIAGE NOTES
"Pt presents c/o allergic reaction today after eating crawfish at a "cookout."  Pt reports itching and bumps around the face and body x 30 mins pta.  Pt also reports slight sore throat and cough.  Denies sob, difficult swallowing, or any other symptoms.  Pt reports taking benadryl pta with some relief.    "

## 2024-08-23 DIAGNOSIS — F41.9 ANXIETY: ICD-10-CM

## 2024-08-23 DIAGNOSIS — F32.0 CURRENT MILD EPISODE OF MAJOR DEPRESSIVE DISORDER WITHOUT PRIOR EPISODE: ICD-10-CM

## 2024-08-23 RX ORDER — ESCITALOPRAM OXALATE 10 MG/1
10 TABLET ORAL DAILY
Qty: 90 TABLET | Refills: 1 | Status: SHIPPED | OUTPATIENT
Start: 2024-08-23

## 2024-08-23 NOTE — TELEPHONE ENCOUNTER
No care due was identified.  [facility] Embedded Care Due Messages. Reference number: [id number].   8/23/2024 12:25:18 AM CDT

## 2024-09-18 ENCOUNTER — OFFICE VISIT (OUTPATIENT)
Dept: FAMILY MEDICINE | Facility: CLINIC | Age: 24
End: 2024-09-18
Payer: COMMERCIAL

## 2024-09-18 VITALS
HEART RATE: 71 BPM | SYSTOLIC BLOOD PRESSURE: 110 MMHG | OXYGEN SATURATION: 98 % | DIASTOLIC BLOOD PRESSURE: 60 MMHG | WEIGHT: 138.25 LBS | HEIGHT: 67 IN | RESPIRATION RATE: 16 BRPM | BODY MASS INDEX: 21.7 KG/M2 | TEMPERATURE: 99 F

## 2024-09-18 DIAGNOSIS — F32.5 MAJOR DEPRESSIVE DISORDER WITH SINGLE EPISODE, IN FULL REMISSION: Primary | ICD-10-CM

## 2024-09-18 PROCEDURE — 1159F MED LIST DOCD IN RCRD: CPT | Mod: CPTII,S$GLB,, | Performed by: NURSE PRACTITIONER

## 2024-09-18 PROCEDURE — 99214 OFFICE O/P EST MOD 30 MIN: CPT | Mod: S$GLB,,, | Performed by: NURSE PRACTITIONER

## 2024-09-18 PROCEDURE — 99999 PR PBB SHADOW E&M-EST. PATIENT-LVL III: CPT | Mod: PBBFAC,,, | Performed by: NURSE PRACTITIONER

## 2024-09-18 PROCEDURE — 1160F RVW MEDS BY RX/DR IN RCRD: CPT | Mod: CPTII,S$GLB,, | Performed by: NURSE PRACTITIONER

## 2024-09-18 PROCEDURE — 3008F BODY MASS INDEX DOCD: CPT | Mod: CPTII,S$GLB,, | Performed by: NURSE PRACTITIONER

## 2024-09-18 PROCEDURE — 3074F SYST BP LT 130 MM HG: CPT | Mod: CPTII,S$GLB,, | Performed by: NURSE PRACTITIONER

## 2024-09-18 PROCEDURE — 3078F DIAST BP <80 MM HG: CPT | Mod: CPTII,S$GLB,, | Performed by: NURSE PRACTITIONER
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
## 2024-09-19 PROBLEM — F32.2 MAJOR DEPRESSIVE DISORDER, SINGLE EPISODE, SEVERE: Status: ACTIVE | Noted: 2024-09-19

## 2024-09-19 PROBLEM — F32.0 CURRENT MILD EPISODE OF MAJOR DEPRESSIVE DISORDER WITHOUT PRIOR EPISODE: Status: RESOLVED | Noted: 2024-04-24 | Resolved: 2024-09-19

## 2024-09-19 PROBLEM — F32.5 MAJOR DEPRESSIVE DISORDER WITH SINGLE EPISODE, IN FULL REMISSION: Status: ACTIVE | Noted: 2024-09-19

## 2024-11-21 DIAGNOSIS — F41.9 ANXIETY: ICD-10-CM

## 2024-11-21 DIAGNOSIS — F32.0 CURRENT MILD EPISODE OF MAJOR DEPRESSIVE DISORDER WITHOUT PRIOR EPISODE: ICD-10-CM

## 2024-11-21 RX ORDER — ALBUTEROL SULFATE 90 UG/1
2 INHALANT RESPIRATORY (INHALATION) EVERY 4 HOURS PRN
Qty: 18 G | Refills: 5 | Status: SHIPPED | OUTPATIENT
Start: 2024-11-21

## 2024-11-21 RX ORDER — ESCITALOPRAM OXALATE 10 MG/1
10 TABLET ORAL DAILY
Qty: 90 TABLET | Refills: 1 | Status: CANCELLED | OUTPATIENT
Start: 2024-11-21

## 2024-11-21 NOTE — TELEPHONE ENCOUNTER
No care due was identified.  [facility] Embedded Care Due Messages. Reference number: [id number].   11/21/2024 2:56:01 PM CST

## 2025-03-03 DIAGNOSIS — F32.0 CURRENT MILD EPISODE OF MAJOR DEPRESSIVE DISORDER WITHOUT PRIOR EPISODE: ICD-10-CM

## 2025-03-03 DIAGNOSIS — F41.9 ANXIETY: ICD-10-CM

## 2025-03-03 RX ORDER — ESCITALOPRAM OXALATE 10 MG/1
10 TABLET ORAL
Qty: 90 TABLET | Refills: 1 | Status: SHIPPED | OUTPATIENT
Start: 2025-03-03

## 2025-03-03 NOTE — TELEPHONE ENCOUNTER
No care due was identified.  [facility] Embedded Care Due Messages. Reference number: [id number].   3/03/2025 3:47:37 AM CST

## 2025-07-15 ENCOUNTER — PATIENT OUTREACH (OUTPATIENT)
Dept: ADMINISTRATIVE | Facility: HOSPITAL | Age: 25
End: 2025-07-15
Payer: COMMERCIAL

## 2025-07-15 NOTE — PROGRESS NOTES
PT is due for an annual checkup, please schedule.  Pt due for Chlamydia Screening per [facility].